# Patient Record
Sex: MALE | Race: BLACK OR AFRICAN AMERICAN | NOT HISPANIC OR LATINO | URBAN - METROPOLITAN AREA
[De-identification: names, ages, dates, MRNs, and addresses within clinical notes are randomized per-mention and may not be internally consistent; named-entity substitution may affect disease eponyms.]

---

## 2017-06-14 ENCOUNTER — EMERGENCY (EMERGENCY)
Facility: HOSPITAL | Age: 62
LOS: 1 days | Discharge: HOME | End: 2017-06-14
Attending: EMERGENCY MEDICINE | Admitting: EMERGENCY MEDICINE
Payer: COMMERCIAL

## 2017-06-14 VITALS
RESPIRATION RATE: 18 BRPM | HEART RATE: 59 BPM | TEMPERATURE: 98 F | WEIGHT: 217.38 LBS | SYSTOLIC BLOOD PRESSURE: 117 MMHG | OXYGEN SATURATION: 99 % | DIASTOLIC BLOOD PRESSURE: 79 MMHG

## 2017-06-14 DIAGNOSIS — M25.562 PAIN IN LEFT KNEE: ICD-10-CM

## 2017-06-14 PROCEDURE — 99284 EMERGENCY DEPT VISIT MOD MDM: CPT | Mod: 25

## 2017-06-14 RX ORDER — KETOROLAC TROMETHAMINE 30 MG/ML
30 SYRINGE (ML) INJECTION ONCE
Qty: 0 | Refills: 0 | Status: DISCONTINUED | OUTPATIENT
Start: 2017-06-14 | End: 2017-06-14

## 2017-06-14 RX ADMIN — Medication 30 MILLIGRAM(S): at 23:40

## 2017-06-14 NOTE — ED ADULT NURSE NOTE - OBJECTIVE STATEMENT
62 year old 62 year old male patient with c/o of L knee pain.  Hx of arthritis.  A+OX3, ambulatory w steady gait.

## 2017-06-14 NOTE — ED PROVIDER NOTE - OBJECTIVE STATEMENT
62M no PMH c/o L knee and leg pain. 62M no PMH c/o L knee and leg pain. pt states pain ongoing x1 week.  states 3 weeks ago had injections to both knees to increase lubrication of joint. pt states pain to L leg occurs when his knee is bent.  has appt with ortho 7/3 for f/u. no swelling. no redness, no fevers, no numbness/weakness. took motrin without relief.

## 2017-06-14 NOTE — ED PROVIDER NOTE - PROGRESS NOTE DETAILS
negative DVT, arthritic changes on xray. recommend ortho f/u  I have discussed the discharge plan with the patient. The patient agrees with the plan, as discussed.  The patient understands Emergency Department diagnosis is a preliminary diagnosis often based on limited information and that the patient must adhere to the follow-up plan as discussed.  The patient understands that if the symptoms worsen or if prescribed medications do not have the desired/planned effect that the patient may return to the Emergency Department at any time for further evaluation and treatment.

## 2017-06-14 NOTE — ED PROVIDER NOTE - MUSCULOSKELETAL MINIMAL EXAM
atraumatic/L knee - decreased flexion d/t pain, no erythema, no warmth, no effusion, neg med/lat laxity, neg ant/post drawer, no edema, no calf tenderness

## 2017-06-15 PROCEDURE — 93971 EXTREMITY STUDY: CPT

## 2017-06-15 PROCEDURE — 93971 EXTREMITY STUDY: CPT | Mod: 26,LT

## 2017-06-15 PROCEDURE — 99284 EMERGENCY DEPT VISIT MOD MDM: CPT | Mod: 25

## 2017-06-15 PROCEDURE — 96372 THER/PROPH/DIAG INJ SC/IM: CPT

## 2017-06-15 PROCEDURE — 73562 X-RAY EXAM OF KNEE 3: CPT

## 2017-06-15 PROCEDURE — 73562 X-RAY EXAM OF KNEE 3: CPT | Mod: 26,LT

## 2017-11-17 PROBLEM — Z00.00 ENCOUNTER FOR PREVENTIVE HEALTH EXAMINATION: Status: ACTIVE | Noted: 2017-11-17

## 2017-11-21 ENCOUNTER — APPOINTMENT (OUTPATIENT)
Dept: ORTHOPEDIC SURGERY | Facility: CLINIC | Age: 62
End: 2017-11-21
Payer: COMMERCIAL

## 2017-11-21 VITALS
BODY MASS INDEX: 29.4 KG/M2 | SYSTOLIC BLOOD PRESSURE: 131 MMHG | DIASTOLIC BLOOD PRESSURE: 88 MMHG | HEIGHT: 71 IN | WEIGHT: 210 LBS | HEART RATE: 61 BPM

## 2017-11-21 DIAGNOSIS — M25.561 PAIN IN RIGHT KNEE: ICD-10-CM

## 2017-11-21 DIAGNOSIS — Z87.39 PERSONAL HISTORY OF OTHER DISEASES OF THE MUSCULOSKELETAL SYSTEM AND CONNECTIVE TISSUE: ICD-10-CM

## 2017-11-21 DIAGNOSIS — Z78.9 OTHER SPECIFIED HEALTH STATUS: ICD-10-CM

## 2017-11-21 DIAGNOSIS — Z87.898 PERSONAL HISTORY OF OTHER SPECIFIED CONDITIONS: ICD-10-CM

## 2017-11-21 DIAGNOSIS — R53.83 OTHER FATIGUE: ICD-10-CM

## 2017-11-21 DIAGNOSIS — M25.562 PAIN IN RIGHT KNEE: ICD-10-CM

## 2017-11-21 PROCEDURE — 99204 OFFICE O/P NEW MOD 45 MIN: CPT

## 2017-11-21 PROCEDURE — 73564 X-RAY EXAM KNEE 4 OR MORE: CPT | Mod: 50

## 2017-11-22 PROBLEM — Z78.9 RARELY CONSUMES ALCOHOL: Status: ACTIVE | Noted: 2017-11-21

## 2017-11-22 PROBLEM — Z87.898 HISTORY OF WEIGHT GAIN: Status: RESOLVED | Noted: 2017-11-21 | Resolved: 2017-11-22

## 2017-11-22 PROBLEM — Z87.39 HISTORY OF RHEUMATOID ARTHRITIS: Status: RESOLVED | Noted: 2017-11-21 | Resolved: 2017-11-22

## 2017-11-22 PROBLEM — Z87.898 HISTORY OF URINARY FREQUENCY: Status: RESOLVED | Noted: 2017-11-21 | Resolved: 2017-11-22

## 2017-11-22 PROBLEM — M25.561 BILATERAL KNEE PAIN: Status: ACTIVE | Noted: 2017-11-21

## 2017-11-22 PROBLEM — Z78.9 EXERCISES OCCASIONALLY: Status: ACTIVE | Noted: 2017-11-21

## 2017-11-22 PROBLEM — Z87.39 HISTORY OF JOINT PAIN: Status: RESOLVED | Noted: 2017-11-21 | Resolved: 2017-11-22

## 2017-11-22 PROBLEM — R53.83 FEELING TIRED: Status: RESOLVED | Noted: 2017-11-21 | Resolved: 2017-11-22

## 2017-11-22 RX ORDER — NAPROXEN 500 MG/1
500 TABLET ORAL
Qty: 60 | Refills: 0 | Status: ACTIVE | COMMUNITY
Start: 2017-07-07

## 2017-11-22 RX ORDER — METAXALONE 800 MG/1
800 TABLET ORAL
Qty: 40 | Refills: 0 | Status: ACTIVE | COMMUNITY
Start: 2017-07-07

## 2017-11-22 RX ORDER — PREDNISONE 5 MG/1
5 TABLET ORAL
Qty: 60 | Refills: 0 | Status: ACTIVE | COMMUNITY
Start: 2017-09-12

## 2018-08-09 ENCOUNTER — APPOINTMENT (OUTPATIENT)
Dept: ORTHOPEDIC SURGERY | Facility: CLINIC | Age: 63
End: 2018-08-09
Payer: COMMERCIAL

## 2018-08-09 VITALS
SYSTOLIC BLOOD PRESSURE: 118 MMHG | BODY MASS INDEX: 29.68 KG/M2 | HEIGHT: 71 IN | DIASTOLIC BLOOD PRESSURE: 76 MMHG | WEIGHT: 212 LBS | HEART RATE: 67 BPM

## 2018-08-09 DIAGNOSIS — M17.0 BILATERAL PRIMARY OSTEOARTHRITIS OF KNEE: ICD-10-CM

## 2018-08-09 PROCEDURE — 99214 OFFICE O/P EST MOD 30 MIN: CPT

## 2018-08-09 PROCEDURE — 73562 X-RAY EXAM OF KNEE 3: CPT | Mod: RT

## 2018-08-28 ENCOUNTER — MESSAGE (OUTPATIENT)
Age: 63
End: 2018-08-28

## 2018-10-02 ENCOUNTER — MESSAGE (OUTPATIENT)
Age: 63
End: 2018-10-02

## 2018-11-26 ENCOUNTER — OUTPATIENT (OUTPATIENT)
Dept: OUTPATIENT SERVICES | Facility: HOSPITAL | Age: 63
LOS: 1 days | End: 2018-11-26
Payer: COMMERCIAL

## 2018-11-26 DIAGNOSIS — Z22.321 CARRIER OR SUSPECTED CARRIER OF METHICILLIN SUSCEPTIBLE STAPHYLOCOCCUS AUREUS: ICD-10-CM

## 2018-11-26 LAB
MRSA PCR RESULT.: NEGATIVE — SIGNIFICANT CHANGE UP
S AUREUS DNA NOSE QL NAA+PROBE: NEGATIVE — SIGNIFICANT CHANGE UP

## 2018-11-26 PROCEDURE — 87641 MR-STAPH DNA AMP PROBE: CPT

## 2018-12-18 ENCOUNTER — APPOINTMENT (OUTPATIENT)
Dept: ORTHOPEDIC SURGERY | Facility: HOSPITAL | Age: 63
End: 2018-12-18

## 2018-12-18 ENCOUNTER — INPATIENT (INPATIENT)
Facility: HOSPITAL | Age: 63
LOS: 3 days | Discharge: HOME HEALTH | DRG: 462 | End: 2018-12-22
Attending: ORTHOPAEDIC SURGERY | Admitting: ORTHOPAEDIC SURGERY
Payer: COMMERCIAL

## 2018-12-18 ENCOUNTER — RESULT REVIEW (OUTPATIENT)
Age: 63
End: 2018-12-18

## 2018-12-18 VITALS
TEMPERATURE: 98 F | OXYGEN SATURATION: 98 % | SYSTOLIC BLOOD PRESSURE: 111 MMHG | HEART RATE: 74 BPM | RESPIRATION RATE: 18 BRPM | HEIGHT: 71 IN | DIASTOLIC BLOOD PRESSURE: 71 MMHG | WEIGHT: 203.05 LBS

## 2018-12-18 DIAGNOSIS — J30.9 ALLERGIC RHINITIS, UNSPECIFIED: ICD-10-CM

## 2018-12-18 DIAGNOSIS — M17.0 BILATERAL PRIMARY OSTEOARTHRITIS OF KNEE: ICD-10-CM

## 2018-12-18 DIAGNOSIS — F41.1 GENERALIZED ANXIETY DISORDER: ICD-10-CM

## 2018-12-18 LAB
BASOPHILS NFR BLD AUTO: 0.1 % — SIGNIFICANT CHANGE UP (ref 0–2)
EOSINOPHIL NFR BLD AUTO: 3.5 % — SIGNIFICANT CHANGE UP (ref 0–6)
HCT VFR BLD CALC: 39.7 % — SIGNIFICANT CHANGE UP (ref 39–50)
HGB BLD-MCNC: 13.2 G/DL — SIGNIFICANT CHANGE UP (ref 13–17)
LYMPHOCYTES # BLD AUTO: 25.1 % — SIGNIFICANT CHANGE UP (ref 13–44)
MCHC RBC-ENTMCNC: 26.4 PG — LOW (ref 27–34)
MCHC RBC-ENTMCNC: 33.2 G/DL — SIGNIFICANT CHANGE UP (ref 32–36)
MCV RBC AUTO: 79.4 FL — LOW (ref 80–100)
MONOCYTES NFR BLD AUTO: 7.8 % — SIGNIFICANT CHANGE UP (ref 2–14)
NEUTROPHILS NFR BLD AUTO: 63.5 % — SIGNIFICANT CHANGE UP (ref 43–77)
PLATELET # BLD AUTO: 241 K/UL — SIGNIFICANT CHANGE UP (ref 150–400)
RBC # BLD: 5 M/UL — SIGNIFICANT CHANGE UP (ref 4.2–5.8)
RBC # FLD: 14.8 % — SIGNIFICANT CHANGE UP (ref 10.3–16.9)
WBC # BLD: 7.1 K/UL — SIGNIFICANT CHANGE UP (ref 3.8–10.5)
WBC # FLD AUTO: 7.1 K/UL — SIGNIFICANT CHANGE UP (ref 3.8–10.5)

## 2018-12-18 PROCEDURE — 73560 X-RAY EXAM OF KNEE 1 OR 2: CPT | Mod: 26,50

## 2018-12-18 PROCEDURE — 27447 TOTAL KNEE ARTHROPLASTY: CPT | Mod: RT

## 2018-12-18 RX ORDER — POLYETHYLENE GLYCOL 3350 17 G/17G
17 POWDER, FOR SOLUTION ORAL DAILY
Qty: 0 | Refills: 0 | Status: DISCONTINUED | OUTPATIENT
Start: 2018-12-19 | End: 2018-12-22

## 2018-12-18 RX ORDER — KETOROLAC TROMETHAMINE 30 MG/ML
15 SYRINGE (ML) INJECTION EVERY 6 HOURS
Qty: 0 | Refills: 0 | Status: DISCONTINUED | OUTPATIENT
Start: 2018-12-18 | End: 2018-12-19

## 2018-12-18 RX ORDER — OXYCODONE HYDROCHLORIDE 5 MG/1
20 TABLET ORAL ONCE
Qty: 0 | Refills: 0 | Status: DISCONTINUED | OUTPATIENT
Start: 2018-12-18 | End: 2018-12-18

## 2018-12-18 RX ORDER — SENNA PLUS 8.6 MG/1
2 TABLET ORAL AT BEDTIME
Qty: 0 | Refills: 0 | Status: DISCONTINUED | OUTPATIENT
Start: 2018-12-18 | End: 2018-12-22

## 2018-12-18 RX ORDER — MAGNESIUM HYDROXIDE 400 MG/1
30 TABLET, CHEWABLE ORAL DAILY
Qty: 0 | Refills: 0 | Status: DISCONTINUED | OUTPATIENT
Start: 2018-12-18 | End: 2018-12-22

## 2018-12-18 RX ORDER — CELECOXIB 200 MG/1
200 CAPSULE ORAL
Qty: 0 | Refills: 0 | Status: DISCONTINUED | OUTPATIENT
Start: 2018-12-19 | End: 2018-12-22

## 2018-12-18 RX ORDER — MORPHINE SULFATE 50 MG/1
4 CAPSULE, EXTENDED RELEASE ORAL EVERY 4 HOURS
Qty: 0 | Refills: 0 | Status: DISCONTINUED | OUTPATIENT
Start: 2018-12-18 | End: 2018-12-19

## 2018-12-18 RX ORDER — MORPHINE SULFATE 50 MG/1
4 CAPSULE, EXTENDED RELEASE ORAL
Qty: 0 | Refills: 0 | Status: DISCONTINUED | OUTPATIENT
Start: 2018-12-18 | End: 2018-12-19

## 2018-12-18 RX ORDER — ONDANSETRON 8 MG/1
4 TABLET, FILM COATED ORAL EVERY 6 HOURS
Qty: 0 | Refills: 0 | Status: DISCONTINUED | OUTPATIENT
Start: 2018-12-18 | End: 2018-12-22

## 2018-12-18 RX ORDER — DOCUSATE SODIUM 100 MG
100 CAPSULE ORAL THREE TIMES A DAY
Qty: 0 | Refills: 0 | Status: DISCONTINUED | OUTPATIENT
Start: 2018-12-18 | End: 2018-12-22

## 2018-12-18 RX ORDER — CEFAZOLIN SODIUM 1 G
2000 VIAL (EA) INJECTION EVERY 8 HOURS
Qty: 0 | Refills: 0 | Status: COMPLETED | OUTPATIENT
Start: 2018-12-18 | End: 2018-12-19

## 2018-12-18 RX ORDER — OXYCODONE HYDROCHLORIDE 5 MG/1
5 TABLET ORAL EVERY 4 HOURS
Qty: 0 | Refills: 0 | Status: DISCONTINUED | OUTPATIENT
Start: 2018-12-18 | End: 2018-12-22

## 2018-12-18 RX ORDER — SODIUM CHLORIDE 9 MG/ML
1000 INJECTION, SOLUTION INTRAVENOUS
Qty: 0 | Refills: 0 | Status: DISCONTINUED | OUTPATIENT
Start: 2018-12-18 | End: 2018-12-19

## 2018-12-18 RX ORDER — BUPIVACAINE 13.3 MG/ML
20 INJECTION, SUSPENSION, LIPOSOMAL INFILTRATION ONCE
Qty: 0 | Refills: 0 | Status: DISCONTINUED | OUTPATIENT
Start: 2018-12-18 | End: 2018-12-22

## 2018-12-18 RX ORDER — ASPIRIN/CALCIUM CARB/MAGNESIUM 324 MG
325 TABLET ORAL
Qty: 0 | Refills: 0 | Status: DISCONTINUED | OUTPATIENT
Start: 2018-12-18 | End: 2018-12-22

## 2018-12-18 RX ORDER — ACETAMINOPHEN 500 MG
650 TABLET ORAL EVERY 4 HOURS
Qty: 0 | Refills: 0 | Status: DISCONTINUED | OUTPATIENT
Start: 2018-12-18 | End: 2018-12-19

## 2018-12-18 RX ORDER — PANTOPRAZOLE SODIUM 20 MG/1
40 TABLET, DELAYED RELEASE ORAL
Qty: 0 | Refills: 0 | Status: DISCONTINUED | OUTPATIENT
Start: 2018-12-19 | End: 2018-12-22

## 2018-12-18 RX ORDER — OXYCODONE HYDROCHLORIDE 5 MG/1
10 TABLET ORAL EVERY 4 HOURS
Qty: 0 | Refills: 0 | Status: DISCONTINUED | OUTPATIENT
Start: 2018-12-18 | End: 2018-12-22

## 2018-12-18 RX ORDER — CELECOXIB 200 MG/1
400 CAPSULE ORAL ONCE
Qty: 0 | Refills: 0 | Status: COMPLETED | OUTPATIENT
Start: 2018-12-18 | End: 2018-12-18

## 2018-12-18 RX ADMIN — Medication 15 MILLIGRAM(S): at 21:42

## 2018-12-18 RX ADMIN — OXYCODONE HYDROCHLORIDE 20 MILLIGRAM(S): 5 TABLET ORAL at 12:35

## 2018-12-18 RX ADMIN — Medication 100 MILLIGRAM(S): at 21:43

## 2018-12-18 RX ADMIN — SENNA PLUS 2 TABLET(S): 8.6 TABLET ORAL at 22:52

## 2018-12-18 RX ADMIN — Medication 100 MILLIGRAM(S): at 22:53

## 2018-12-18 RX ADMIN — CELECOXIB 400 MILLIGRAM(S): 200 CAPSULE ORAL at 12:35

## 2018-12-18 RX ADMIN — Medication 15 MILLIGRAM(S): at 21:22

## 2018-12-18 RX ADMIN — OXYCODONE HYDROCHLORIDE 5 MILLIGRAM(S): 5 TABLET ORAL at 21:43

## 2018-12-18 RX ADMIN — OXYCODONE HYDROCHLORIDE 5 MILLIGRAM(S): 5 TABLET ORAL at 21:26

## 2018-12-18 NOTE — H&P ADULT - HISTORY OF PRESENT ILLNESS
62 yo male with chronic bilateral knee pain worsened over time without improvement. Denies numbness, tingling. Ambulates without assist. Presents today for elective B/l TKA

## 2018-12-18 NOTE — H&P ADULT - NSHPPHYSICALEXAM_GEN_ALL_CORE
bilateral LE: Decreased ROM secondary to pain, sensation intact, DP 2+, brisk cap refill, EHL/FHL/TA/GS 5/5  Rest of PE per MD clearance

## 2018-12-18 NOTE — BRIEF OPERATIVE NOTE - PROCEDURE
<<-----Click on this checkbox to enter Procedure Bilateral total knee replacements  12/18/2018    Active  [facility]

## 2018-12-18 NOTE — CONSULT NOTE ADULT - SUBJECTIVE AND OBJECTIVE BOX
HPI:  63 year old male with osteoarthritis bilateral knees with moderate knee pain, deformity, decreased ROM and unsteady gait.  X rays confirm diagnosis.  Symptoms worse with stairs and after prolonged immobility and increased over years.    PAST MEDICAL & SURGICAL HISTORY:  Denies DM HBP PUD ASTHMA  allergic rhinitis  No significant past surgical history      REVIEW OF SYSTEMS    General:  normal  Skin/Breast: normal  Ophthalmologic: negative  ENMT:	normal  Respiratory and Thorax: normal  Cardiovascular:	normal  Gastrointestinal:	normal  Genitourinary:	normal  Musculoskeletal:	 bilateral knee swelling   Neurological:	normal  Psychiatric:	anxiety  Hematology/Lymphatics:	 negative  Endocrine:	negative  Allergic/Immunologic:	negative      MEDICATIONS      Allergies    No Known Allergies    SOCIAL HISTORY: no cigs social alcohol    FAMILY HISTORY: non contributory    PHYSICAL EXAM:     Vital Signs Last 24 Hrs  T(C): --  T(F): --98.6  HR: --72  BP: --120/80  BP(mean): --  RR: --16  SpO2: --    Constitutional: WDWNM in NAD  Eyes: conj pink  ENMT: negative  Neck: supple  Breasts: not examined   Back: negative  Respiratory: clear to P&A  Cardiovascular: no MRGT or H  Gastrointestinal: normal bowel sounds  Genitourinary: neg  Rectal: not examined  Extremities: normal  Vascular: normal  Neurological: normal  Skin: negative  Lymph Nodes: negative  Musculoskeletal:   decreased ROM  bilateral knees  Psychiatric: anxiety

## 2018-12-18 NOTE — H&P ADULT - ASSESSMENT
62 yo male with chronic  bilateral knee pain failed conservative management will proceed with elective B/L TKA. Patient understands risks vs. benefits

## 2018-12-19 ENCOUNTER — TRANSCRIPTION ENCOUNTER (OUTPATIENT)
Age: 63
End: 2018-12-19

## 2018-12-19 DIAGNOSIS — M25.561 PAIN IN RIGHT KNEE: ICD-10-CM

## 2018-12-19 LAB
ANION GAP SERPL CALC-SCNC: 9 MMOL/L — SIGNIFICANT CHANGE UP (ref 5–17)
BUN SERPL-MCNC: 9 MG/DL — SIGNIFICANT CHANGE UP (ref 7–23)
CALCIUM SERPL-MCNC: 8.8 MG/DL — SIGNIFICANT CHANGE UP (ref 8.4–10.5)
CHLORIDE SERPL-SCNC: 98 MMOL/L — SIGNIFICANT CHANGE UP (ref 96–108)
CO2 SERPL-SCNC: 27 MMOL/L — SIGNIFICANT CHANGE UP (ref 22–31)
CREAT SERPL-MCNC: 0.84 MG/DL — SIGNIFICANT CHANGE UP (ref 0.5–1.3)
GLUCOSE SERPL-MCNC: 145 MG/DL — HIGH (ref 70–99)
HCT VFR BLD CALC: 35.9 % — LOW (ref 39–50)
HGB BLD-MCNC: 11.9 G/DL — LOW (ref 13–17)
MCHC RBC-ENTMCNC: 26.4 PG — LOW (ref 27–34)
MCHC RBC-ENTMCNC: 33.1 G/DL — SIGNIFICANT CHANGE UP (ref 32–36)
MCV RBC AUTO: 79.8 FL — LOW (ref 80–100)
PLATELET # BLD AUTO: 214 K/UL — SIGNIFICANT CHANGE UP (ref 150–400)
POTASSIUM SERPL-MCNC: 4 MMOL/L — SIGNIFICANT CHANGE UP (ref 3.5–5.3)
POTASSIUM SERPL-SCNC: 4 MMOL/L — SIGNIFICANT CHANGE UP (ref 3.5–5.3)
RBC # BLD: 4.5 M/UL — SIGNIFICANT CHANGE UP (ref 4.2–5.8)
RBC # FLD: 14.8 % — SIGNIFICANT CHANGE UP (ref 10.3–16.9)
SODIUM SERPL-SCNC: 134 MMOL/L — LOW (ref 135–145)
SURGICAL PATHOLOGY STUDY: SIGNIFICANT CHANGE UP
WBC # BLD: 11.2 K/UL — HIGH (ref 3.8–10.5)
WBC # FLD AUTO: 11.2 K/UL — HIGH (ref 3.8–10.5)

## 2018-12-19 RX ORDER — OXYCODONE HYDROCHLORIDE 5 MG/1
10 TABLET ORAL
Qty: 0 | Refills: 0 | Status: DISCONTINUED | OUTPATIENT
Start: 2018-12-19 | End: 2018-12-22

## 2018-12-19 RX ORDER — HYDROMORPHONE HYDROCHLORIDE 2 MG/ML
0.5 INJECTION INTRAMUSCULAR; INTRAVENOUS; SUBCUTANEOUS EVERY 4 HOURS
Qty: 0 | Refills: 0 | Status: DISCONTINUED | OUTPATIENT
Start: 2018-12-19 | End: 2018-12-22

## 2018-12-19 RX ORDER — ACETAMINOPHEN 500 MG
975 TABLET ORAL EVERY 8 HOURS
Qty: 0 | Refills: 0 | Status: DISCONTINUED | OUTPATIENT
Start: 2018-12-19 | End: 2018-12-22

## 2018-12-19 RX ADMIN — POLYETHYLENE GLYCOL 3350 17 GRAM(S): 17 POWDER, FOR SOLUTION ORAL at 13:03

## 2018-12-19 RX ADMIN — OXYCODONE HYDROCHLORIDE 10 MILLIGRAM(S): 5 TABLET ORAL at 21:45

## 2018-12-19 RX ADMIN — Medication 100 MILLIGRAM(S): at 13:03

## 2018-12-19 RX ADMIN — OXYCODONE HYDROCHLORIDE 10 MILLIGRAM(S): 5 TABLET ORAL at 16:05

## 2018-12-19 RX ADMIN — MORPHINE SULFATE 4 MILLIGRAM(S): 50 CAPSULE, EXTENDED RELEASE ORAL at 13:29

## 2018-12-19 RX ADMIN — OXYCODONE HYDROCHLORIDE 5 MILLIGRAM(S): 5 TABLET ORAL at 10:23

## 2018-12-19 RX ADMIN — OXYCODONE HYDROCHLORIDE 10 MILLIGRAM(S): 5 TABLET ORAL at 02:00

## 2018-12-19 RX ADMIN — Medication 975 MILLIGRAM(S): at 21:01

## 2018-12-19 RX ADMIN — MORPHINE SULFATE 4 MILLIGRAM(S): 50 CAPSULE, EXTENDED RELEASE ORAL at 13:03

## 2018-12-19 RX ADMIN — HYDROMORPHONE HYDROCHLORIDE 0.5 MILLIGRAM(S): 2 INJECTION INTRAMUSCULAR; INTRAVENOUS; SUBCUTANEOUS at 19:50

## 2018-12-19 RX ADMIN — OXYCODONE HYDROCHLORIDE 5 MILLIGRAM(S): 5 TABLET ORAL at 11:16

## 2018-12-19 RX ADMIN — OXYCODONE HYDROCHLORIDE 10 MILLIGRAM(S): 5 TABLET ORAL at 21:01

## 2018-12-19 RX ADMIN — HYDROMORPHONE HYDROCHLORIDE 0.5 MILLIGRAM(S): 2 INJECTION INTRAMUSCULAR; INTRAVENOUS; SUBCUTANEOUS at 19:26

## 2018-12-19 RX ADMIN — Medication 100 MILLIGRAM(S): at 05:43

## 2018-12-19 RX ADMIN — Medication 1 TABLET(S): at 13:03

## 2018-12-19 RX ADMIN — Medication 15 MILLIGRAM(S): at 09:45

## 2018-12-19 RX ADMIN — Medication 15 MILLIGRAM(S): at 03:30

## 2018-12-19 RX ADMIN — Medication 975 MILLIGRAM(S): at 21:45

## 2018-12-19 RX ADMIN — PANTOPRAZOLE SODIUM 40 MILLIGRAM(S): 20 TABLET, DELAYED RELEASE ORAL at 07:13

## 2018-12-19 RX ADMIN — Medication 15 MILLIGRAM(S): at 02:52

## 2018-12-19 RX ADMIN — OXYCODONE HYDROCHLORIDE 10 MILLIGRAM(S): 5 TABLET ORAL at 17:00

## 2018-12-19 RX ADMIN — Medication 325 MILLIGRAM(S): at 16:05

## 2018-12-19 RX ADMIN — Medication 325 MILLIGRAM(S): at 05:43

## 2018-12-19 RX ADMIN — Medication 100 MILLIGRAM(S): at 21:01

## 2018-12-19 RX ADMIN — SENNA PLUS 2 TABLET(S): 8.6 TABLET ORAL at 21:01

## 2018-12-19 RX ADMIN — Medication 15 MILLIGRAM(S): at 09:25

## 2018-12-19 RX ADMIN — OXYCODONE HYDROCHLORIDE 10 MILLIGRAM(S): 5 TABLET ORAL at 01:16

## 2018-12-19 NOTE — DISCHARGE NOTE ADULT - MEDICATION SUMMARY - MEDICATIONS TO TAKE
I will START or STAY ON the medications listed below when I get home from the hospital:    celecoxib 200 mg oral capsule  -- 1 cap(s) by mouth 2 times a day   -- Indication: For Acute pain of both knees    oxyCODONE 10 mg oral tablet  -- 1 tab(s) by mouth every 4 hours, As needed, Severe Pain (7 - 10) MDD:6  -- Indication: For Acute pain of both knees    aspirin 325 mg oral delayed release tablet  -- 1 tab(s) by mouth 2 times a day  -- Indication: For Acute pain of both knees    docusate sodium 100 mg oral capsule  -- 1 cap(s) by mouth 3 times a day  -- Indication: For Acute pain of both knees    senna oral tablet  -- 2 tab(s) by mouth once a day (at bedtime), As needed, Constipation-- 2nd Line  -- Indication: For Acute pain of both knees

## 2018-12-19 NOTE — PROGRESS NOTE ADULT - SUBJECTIVE AND OBJECTIVE BOX
HPI:  63 year old male with osteoarthritis bilateral knees with moderate knee pain, deformity, decreased ROM and unsteady gait.  X rays confirm diagnosis.  Symptoms worse with stairs and after prolonged immobility and increased over years.  Patient now day #1 post op bilateral TKR with moderate bilateral knee pain and malaise.    PAST MEDICAL & SURGICAL HISTORY:  Denies DM HBP PUD ASTHMA  allergic rhinitis  No significant past surgical history      MEDICATIONS  (STANDING):  aspirin enteric coated 325 milliGRAM(s) Oral two times a day  BUpivacaine liposome 1.3% Injectable (no eMAR) 20 milliLiter(s) Local Injection once  celecoxib 200 milliGRAM(s) Oral two times a day  docusate sodium 100 milliGRAM(s) Oral three times a day  ketorolac   Injectable 15 milliGRAM(s) IV Push every 6 hours  lactated ringers. 1000 milliLiter(s) (80 mL/Hr) IV Continuous <Continuous>  multivitamin 1 Tablet(s) Oral daily  pantoprazole    Tablet 40 milliGRAM(s) Oral before breakfast  polyethylene glycol 3350 17 Gram(s) Oral daily    MEDICATIONS  (PRN):  acetaminophen   Tablet .. 650 milliGRAM(s) Oral every 4 hours PRN Mild Pain (1 - 3)  aluminum hydroxide/magnesium hydroxide/simethicone Suspension 30 milliLiter(s) Oral four times a day PRN Indigestion  magnesium hydroxide Suspension 30 milliLiter(s) Oral daily PRN Constipation--1st Line  morphine  - Injectable 4 milliGRAM(s) IV Push every 4 hours PRN breakthrough pain  morphine  - Injectable 4 milliGRAM(s) IV Push every 15 minutes PRN Severe Pain (7 - 10)  ondansetron Injectable 4 milliGRAM(s) IV Push every 6 hours PRN Nausea and/or Vomiting  oxyCODONE    IR 5 milliGRAM(s) Oral every 4 hours PRN Moderate Pain (4 - 6)  oxyCODONE    IR 10 milliGRAM(s) Oral every 4 hours PRN Severe Pain (7 - 10)  senna 2 Tablet(s) Oral at bedtime PRN Constipation-- 2nd Line    Allergies    No Known Allergies    REVIEW OF SYSTEMS    General:  malaise	  Skin/Breast: normal  Ophthalmologic: negative  ENMT:	normal  Respiratory and Thorax: normal  Cardiovascular:	normal  Gastrointestinal:	normal  Genitourinary:	normal  Musculoskeletal:	 bilateral knee swelling   Neurological:	normal  Psychiatric:	normal  Hematology/Lymphatics:	 negative  Endocrine:	negative  Allergic/Immunologic:	negative      PHYSICAL EXAM:  Daily Height in cm: 180.34 (18 Dec 2018 11:44)       Vital Signs Last 24 Hrs  T(C): 36.9 (19 Dec 2018 05:20), Max: 36.9 (19 Dec 2018 05:20)  T(F): 98.4 (19 Dec 2018 05:20), Max: 98.4 (19 Dec 2018 05:20)  HR: 73 (19 Dec 2018 05:20) (48 - 74)  BP: 107/59 (19 Dec 2018 05:20) (107/59 - 139/69)  BP(mean): 99 (18 Dec 2018 21:17) (86 - 99)  RR: 16 (19 Dec 2018 05:20) (10 - 20)  SpO2: 98% (19 Dec 2018 05:20) (96% - 100%)    Constitutional: WDWNM  Eyes: conj pale  ENMT: negative  Neck: supple  Breasts: not examined   Back: negative  Respiratory: clear to P&A  Cardiovascular: no MRGT or H  Gastrointestinal: normal bowel sounds  Genitourinary: neg  Rectal: not examined  Extremities: normal  Vascular: normal  Neurological: normal  Skin: negative  Lymph Nodes: negative  Musculoskeletal:   decreased ROM  bilateral knees  Psychiatric: anxiety                        13.2   7.1   )-----------( 241      ( 18 Dec 2018 17:28 )             39.7

## 2018-12-19 NOTE — DISCHARGE NOTE ADULT - CARE PROVIDER_API CALL
Richi Guaman), Orthopaedic Surgery  [address] [place] [address]  [phone]  Fax: [phone]

## 2018-12-19 NOTE — PHYSICAL THERAPY INITIAL EVALUATION ADULT - PERTINENT HX OF CURRENT PROBLEM, REHAB EVAL
62 yo male with chronic bilateral knee pain worsened over time without improvement. Denies numbness, tingling. Ambulates without assist. Now s/p B/l TKA

## 2018-12-19 NOTE — PHYSICAL THERAPY INITIAL EVALUATION ADULT - GAIT DEVIATIONS NOTED, PT EVAL
decreased stride length/decreased swing-to-stance ratio/increased time in double stance/decreased step length/decreased velocity of limb motion

## 2018-12-19 NOTE — DISCHARGE NOTE ADULT - PATIENT PORTAL LINK FT
You can access the [facility], offered by [facility], by registering with the following website: [email or web address]

## 2018-12-19 NOTE — PROGRESS NOTE ADULT - SUBJECTIVE AND OBJECTIVE BOX
Pt seen and examined. Pt without complaints.    Focused exam:  b/l knees with dsgs intact with scant serosanguineous drainage  b/l le strength 5/5 GS, TA, FHL, EHL  b/l le SILT and WWP      A/P: 62yo male s/p b/l TKR    -DVT PPX: ASA 325mg bid  -WBS: WBAT  -Pain control: Pain regimen adjusted  -Disp: Pending

## 2018-12-19 NOTE — PROGRESS NOTE ADULT - SUBJECTIVE AND OBJECTIVE BOX
SUBJECTIVE: Patient seen and examined. No acute events overnight, pain well controlled, no fevers, chills, nausea, vomiting, chest pain, shortness of breath.    OBJECTIVE:     Vital Signs Last 24 Hrs  T(C): 36.9 (19 Dec 2018 05:20), Max: 36.9 (19 Dec 2018 05:20)  T(F): 98.4 (19 Dec 2018 05:20), Max: 98.4 (19 Dec 2018 05:20)  HR: 73 (19 Dec 2018 05:20) (48 - 74)  BP: 107/59 (19 Dec 2018 05:20) (107/59 - 139/69)  BP(mean): 99 (18 Dec 2018 21:17) (86 - 99)  RR: 16 (19 Dec 2018 05:20) (10 - 20)  SpO2: 98% (19 Dec 2018 05:20) (96% - 100%)    AxO x3, NAD  Unlabored respirations  BLE: dressing clean, dry, intact, intact DF/PF/EHL, SILT distally, foot warm, well perfused, calves soft, non-tender    LABS:                        11.9   11.2  )-----------( 214      ( 19 Dec 2018 06:05 )             35.9     12-19    134<L>  |  98  |  9   ----------------------------<  145<H>  4.0   |  27  |  0.84    Ca    8.8      19 Dec 2018 06:00            MEDICATIONS:acetaminophen   Tablet .. 650 milliGRAM(s) Oral every 4 hours PRN  aluminum hydroxide/magnesium hydroxide/simethicone Suspension 30 milliLiter(s) Oral four times a day PRN  aspirin enteric coated 325 milliGRAM(s) Oral two times a day  BUpivacaine liposome 1.3% Injectable (no eMAR) 20 milliLiter(s) Local Injection once  celecoxib 200 milliGRAM(s) Oral two times a day  docusate sodium 100 milliGRAM(s) Oral three times a day  ketorolac   Injectable 15 milliGRAM(s) IV Push every 6 hours  lactated ringers. 1000 milliLiter(s) IV Continuous <Continuous>  magnesium hydroxide Suspension 30 milliLiter(s) Oral daily PRN  morphine  - Injectable 4 milliGRAM(s) IV Push every 4 hours PRN  morphine  - Injectable 4 milliGRAM(s) IV Push every 15 minutes PRN  multivitamin 1 Tablet(s) Oral daily  ondansetron Injectable 4 milliGRAM(s) IV Push every 6 hours PRN  oxyCODONE    IR 5 milliGRAM(s) Oral every 4 hours PRN  oxyCODONE    IR 10 milliGRAM(s) Oral every 4 hours PRN  pantoprazole    Tablet 40 milliGRAM(s) Oral before breakfast  polyethylene glycol 3350 17 Gram(s) Oral daily  senna 2 Tablet(s) Oral at bedtime PRN    Anticoagulation:  aspirin enteric coated 325 milliGRAM(s) Oral two times a day      Antibiotics:       Pain medications:   acetaminophen   Tablet .. 650 milliGRAM(s) Oral every 4 hours PRN  celecoxib 200 milliGRAM(s) Oral two times a day  ketorolac   Injectable 15 milliGRAM(s) IV Push every 6 hours  morphine  - Injectable 4 milliGRAM(s) IV Push every 4 hours PRN  morphine  - Injectable 4 milliGRAM(s) IV Push every 15 minutes PRN  ondansetron Injectable 4 milliGRAM(s) IV Push every 6 hours PRN  oxyCODONE    IR 5 milliGRAM(s) Oral every 4 hours PRN  oxyCODONE    IR 10 milliGRAM(s) Oral every 4 hours PRN      RADIOLOGY & ADDITIONAL STUDIES:    A/P :     POD #1 s/p Prosper TKA    -recovering well  -pain control  -ophelia-op abx x24 hrs  -PT, WBAT  -DVT ppx:  BID, SCDs  -dispo: pending    Hal Tracy MD  Adult Reconstruction Fellow

## 2018-12-19 NOTE — PHYSICAL THERAPY INITIAL EVALUATION ADULT - ADDITIONAL COMMENTS
Patient lives with spouse in apartment building with elevator access (no steps). Does not use any DME at baseline. Spouse will be taking a month off to assist patient at home.

## 2018-12-19 NOTE — PHYSICAL THERAPY INITIAL EVALUATION ADULT - MODALITIES TREATMENT COMMENTS
b/l DP 2+. Supine, bilateral LEs: glute sets, quad sets, heel slides, ankle pumps (all through full range, 1 set, 10 reps). Patient tolerated therex well. Educated patient to perform therex regimen 3-5x/day, patient verbalized understanding; written handout provided.

## 2018-12-19 NOTE — DISCHARGE NOTE ADULT - ADDITIONAL INSTRUCTIONS
No strenuous activity, heavy lifting, driving or returning to work until cleared by MD.  You may shower - dressing is water-resistant, no soaking in bathtubs.  Remove dressing after post op day 5-7, then leave incision open to air. Keep incision clean and dry.  Try to have regular bowel movements, take stool softener or laxative if necessary.  May take Pepcid or Zantac for upset stomach.  May take Aleve or Naproxen instead of Meloxicam.  Swelling may travel all the way down leg to foot, this is normal and will subside in a few weeks.  Call to schedule an appointment with  _________ for follow up, if you have staples or sutures they will be removed in office.  Contact your doctor if you experience: fever greater than 101.5, chills, chest pain, difficulty breathing, redness or excessive drainage around the incision, other concerns.  Follow up with your primary care provider. No strenuous activity, heavy lifting, driving or returning to work until cleared by MD.  You may shower - dressing is water-resistant, no soaking in bathtubs.  Remove dressing after post op day 5-7, then leave incision open to air. Keep incision clean and dry.  Try to have regular bowel movements, take stool softener or laxative if necessary.  May take Pepcid or Zantac for upset stomach.  May take Aleve or Naproxen instead of Meloxicam.  Swelling may travel all the way down leg to foot, this is normal and will subside in a few weeks.  Call to schedule an appt with Dr. Guaman for follow up, if you have staples or sutures they will be removed in office.  Contact your doctor if you experience: fever greater than 101.5, chills, chest pain, difficulty breathing, redness or excessive drainage around the incision, other concerns.  See Dr. Guaman discharge instructions

## 2018-12-19 NOTE — DISCHARGE NOTE ADULT - CARE PLAN
Principal Discharge DX:	Primary osteoarthritis of both knees  Goal:	Improvement  Assessment and plan of treatment:	See below

## 2018-12-19 NOTE — PHYSICAL THERAPY INITIAL EVALUATION ADULT - GENERAL OBSERVATIONS, REHAB EVAL
Patient received semi-supine in no acute distress, c/o 2/10 pain (pre-medicated), +Telemetry, +SDCs, +Cryocuffs, +Heplock. Cleared by LEELA Cho.

## 2018-12-19 NOTE — PHYSICAL THERAPY INITIAL EVALUATION ADULT - CRITERIA FOR SKILLED THERAPEUTIC INTERVENTIONS
impairments found/predicted duration of therapy intervention/functional limitations in following categories/therapy frequency/anticipated equipment needs at discharge/anticipated discharge recommendation/risk reduction/prevention

## 2018-12-20 DIAGNOSIS — D62 ACUTE POSTHEMORRHAGIC ANEMIA: ICD-10-CM

## 2018-12-20 LAB
ANION GAP SERPL CALC-SCNC: 14 MMOL/L — SIGNIFICANT CHANGE UP (ref 5–17)
BUN SERPL-MCNC: 12 MG/DL — SIGNIFICANT CHANGE UP (ref 7–23)
CALCIUM SERPL-MCNC: 9.7 MG/DL — SIGNIFICANT CHANGE UP (ref 8.4–10.5)
CHLORIDE SERPL-SCNC: 96 MMOL/L — SIGNIFICANT CHANGE UP (ref 96–108)
CO2 SERPL-SCNC: 28 MMOL/L — SIGNIFICANT CHANGE UP (ref 22–31)
CREAT SERPL-MCNC: 1.01 MG/DL — SIGNIFICANT CHANGE UP (ref 0.5–1.3)
GLUCOSE SERPL-MCNC: 129 MG/DL — HIGH (ref 70–99)
HCT VFR BLD CALC: 36.1 % — LOW (ref 39–50)
HGB BLD-MCNC: 11.7 G/DL — LOW (ref 13–17)
MCHC RBC-ENTMCNC: 25.9 PG — LOW (ref 27–34)
MCHC RBC-ENTMCNC: 32.4 G/DL — SIGNIFICANT CHANGE UP (ref 32–36)
MCV RBC AUTO: 79.9 FL — LOW (ref 80–100)
PLATELET # BLD AUTO: 219 K/UL — SIGNIFICANT CHANGE UP (ref 150–400)
POTASSIUM SERPL-MCNC: 5 MMOL/L — SIGNIFICANT CHANGE UP (ref 3.5–5.3)
POTASSIUM SERPL-SCNC: 5 MMOL/L — SIGNIFICANT CHANGE UP (ref 3.5–5.3)
RBC # BLD: 4.52 M/UL — SIGNIFICANT CHANGE UP (ref 4.2–5.8)
RBC # FLD: 14.8 % — SIGNIFICANT CHANGE UP (ref 10.3–16.9)
SODIUM SERPL-SCNC: 138 MMOL/L — SIGNIFICANT CHANGE UP (ref 135–145)
WBC # BLD: 9.8 K/UL — SIGNIFICANT CHANGE UP (ref 3.8–10.5)
WBC # FLD AUTO: 9.8 K/UL — SIGNIFICANT CHANGE UP (ref 3.8–10.5)

## 2018-12-20 RX ORDER — LACTULOSE 10 G/15ML
20 SOLUTION ORAL ONCE
Qty: 0 | Refills: 0 | Status: COMPLETED | OUTPATIENT
Start: 2018-12-20 | End: 2018-12-20

## 2018-12-20 RX ADMIN — Medication 325 MILLIGRAM(S): at 17:49

## 2018-12-20 RX ADMIN — PANTOPRAZOLE SODIUM 40 MILLIGRAM(S): 20 TABLET, DELAYED RELEASE ORAL at 06:24

## 2018-12-20 RX ADMIN — OXYCODONE HYDROCHLORIDE 10 MILLIGRAM(S): 5 TABLET ORAL at 18:43

## 2018-12-20 RX ADMIN — OXYCODONE HYDROCHLORIDE 10 MILLIGRAM(S): 5 TABLET ORAL at 07:45

## 2018-12-20 RX ADMIN — Medication 975 MILLIGRAM(S): at 06:24

## 2018-12-20 RX ADMIN — POLYETHYLENE GLYCOL 3350 17 GRAM(S): 17 POWDER, FOR SOLUTION ORAL at 11:42

## 2018-12-20 RX ADMIN — CELECOXIB 200 MILLIGRAM(S): 200 CAPSULE ORAL at 06:26

## 2018-12-20 RX ADMIN — OXYCODONE HYDROCHLORIDE 10 MILLIGRAM(S): 5 TABLET ORAL at 08:16

## 2018-12-20 RX ADMIN — CELECOXIB 200 MILLIGRAM(S): 200 CAPSULE ORAL at 07:45

## 2018-12-20 RX ADMIN — OXYCODONE HYDROCHLORIDE 10 MILLIGRAM(S): 5 TABLET ORAL at 21:30

## 2018-12-20 RX ADMIN — Medication 975 MILLIGRAM(S): at 14:30

## 2018-12-20 RX ADMIN — CELECOXIB 200 MILLIGRAM(S): 200 CAPSULE ORAL at 18:49

## 2018-12-20 RX ADMIN — Medication 1 TABLET(S): at 11:42

## 2018-12-20 RX ADMIN — Medication 975 MILLIGRAM(S): at 07:45

## 2018-12-20 RX ADMIN — Medication 100 MILLIGRAM(S): at 21:30

## 2018-12-20 RX ADMIN — Medication 975 MILLIGRAM(S): at 13:30

## 2018-12-20 RX ADMIN — LACTULOSE 20 GRAM(S): 10 SOLUTION ORAL at 14:49

## 2018-12-20 RX ADMIN — Medication 975 MILLIGRAM(S): at 22:30

## 2018-12-20 RX ADMIN — Medication 100 MILLIGRAM(S): at 13:30

## 2018-12-20 RX ADMIN — OXYCODONE HYDROCHLORIDE 10 MILLIGRAM(S): 5 TABLET ORAL at 22:30

## 2018-12-20 RX ADMIN — Medication 100 MILLIGRAM(S): at 06:24

## 2018-12-20 RX ADMIN — Medication 975 MILLIGRAM(S): at 21:30

## 2018-12-20 RX ADMIN — CELECOXIB 200 MILLIGRAM(S): 200 CAPSULE ORAL at 17:49

## 2018-12-20 RX ADMIN — Medication 325 MILLIGRAM(S): at 06:24

## 2018-12-20 RX ADMIN — OXYCODONE HYDROCHLORIDE 10 MILLIGRAM(S): 5 TABLET ORAL at 01:19

## 2018-12-20 RX ADMIN — OXYCODONE HYDROCHLORIDE 10 MILLIGRAM(S): 5 TABLET ORAL at 12:41

## 2018-12-20 RX ADMIN — OXYCODONE HYDROCHLORIDE 10 MILLIGRAM(S): 5 TABLET ORAL at 11:41

## 2018-12-20 RX ADMIN — OXYCODONE HYDROCHLORIDE 10 MILLIGRAM(S): 5 TABLET ORAL at 00:30

## 2018-12-20 RX ADMIN — OXYCODONE HYDROCHLORIDE 10 MILLIGRAM(S): 5 TABLET ORAL at 17:49

## 2018-12-20 NOTE — PROGRESS NOTE ADULT - SUBJECTIVE AND OBJECTIVE BOX
HPI:  63 year old male with osteoarthritis bilateral knees with moderate knee pain, deformity, decreased ROM and unsteady gait.  X rays confirm diagnosis.  Symptoms worse with stairs and after prolonged immobility and increased over years.  Patient now day #2 post op bilateral TKR with moderate bilateral knee pain and malaise.    PAST MEDICAL & SURGICAL HISTORY:  Denies DM HBP PUD ASTHMA  allergic rhinitis  No significant past surgical history      MEDICATIONS  (STANDING):  acetaminophen   Tablet .. 975 milliGRAM(s) Oral every 8 hours  aspirin enteric coated 325 milliGRAM(s) Oral two times a day  BUpivacaine liposome 1.3% Injectable (no eMAR) 20 milliLiter(s) Local Injection once  celecoxib 200 milliGRAM(s) Oral two times a day  docusate sodium 100 milliGRAM(s) Oral three times a day  multivitamin 1 Tablet(s) Oral daily  oxyCODONE  ER Tablet 10 milliGRAM(s) Oral <User Schedule>  pantoprazole    Tablet 40 milliGRAM(s) Oral before breakfast  polyethylene glycol 3350 17 Gram(s) Oral daily    MEDICATIONS  (PRN):  aluminum hydroxide/magnesium hydroxide/simethicone Suspension 30 milliLiter(s) Oral four times a day PRN Indigestion  bisacodyl Suppository 10 milliGRAM(s) Rectal daily PRN If no bowel movement by postoperative day #2  HYDROmorphone  Injectable 0.5 milliGRAM(s) IV Push every 4 hours PRN break through pain  magnesium hydroxide Suspension 30 milliLiter(s) Oral daily PRN Constipation--1st Line  ondansetron Injectable 4 milliGRAM(s) IV Push every 6 hours PRN Nausea and/or Vomiting  oxyCODONE    IR 5 milliGRAM(s) Oral every 4 hours PRN Moderate Pain (4 - 6)  oxyCODONE    IR 10 milliGRAM(s) Oral every 4 hours PRN Severe Pain (7 - 10)  senna 2 Tablet(s) Oral at bedtime PRN Constipation-- 2nd Line    Allergies    No Known Allergies    REVIEW OF SYSTEMS    General:  malaise	  Skin/Breast: normal  Ophthalmologic: negative  ENMT:	normal  Respiratory and Thorax: normal  Cardiovascular:	normal  Gastrointestinal:	normal  Genitourinary:	normal  Musculoskeletal:	 bilateral knee swelling   Neurological:	normal  Psychiatric:	normal  Hematology/Lymphatics: negative  Endocrine:	negative  Allergic/Immunologic:	negative      PHYSICAL EXAM:      Vital Signs Last 24 Hrs  T(C): 36.9 (20 Dec 2018 04:41), Max: 38.1 (19 Dec 2018 20:23)  T(F): 98.4 (20 Dec 2018 04:41), Max: 100.5 (19 Dec 2018 20:23)  HR: 82 (20 Dec 2018 04:41) (72 - 96)  BP: 100/62 (20 Dec 2018 04:41) (99/69 - 140/66)  BP(mean): --  RR: 16 (20 Dec 2018 04:41) (14 - 17)  SpO2: 98% (20 Dec 2018 04:41) (94% - 100%)    Constitutional: WDWNF    Eyes: conj pale  ENMT: negative  Neck: supple  Breasts: not examined   Back: negative  Respiratory: clear to P&A  Cardiovascular: no MRGT or H  Gastrointestinal: normal bowel sounds  Genitourinary: neg  Rectal: not examined  Extremities: normal  Vascular: normal  Neurological: normal  Skin: negative  Lymph Nodes: negative  Musculoskeletal:   decreased ROM  bilateral knees  Psychiatric: anxiety                        11.9   11.2  )-----------( 214      ( 19 Dec 2018 06:05 )             35.9       12-19    134<L>  |  98  |  9   ----------------------------<  145<H>  4.0   |  27  |  0.84    Ca    8.8      19 Dec 2018 06:00

## 2018-12-20 NOTE — PROGRESS NOTE ADULT - SUBJECTIVE AND OBJECTIVE BOX
POST OPERATIVE DAY #: 2  STATUS POST: Bilateral TKA    SUBJECTIVE: Patient seen and examined. Pt. states he has been progressing well with physical therapy and anticipating going home soon. Denies any sob/cp/n/v.       OBJECTIVE:     Vital Signs Last 24 Hrs  T(C): 36.8 (20 Dec 2018 09:15), Max: 38.1 (19 Dec 2018 20:23)  T(F): 98.3 (20 Dec 2018 09:15), Max: 100.5 (19 Dec 2018 20:23)  HR: 94 (20 Dec 2018 09:15) (63 - 96)  BP: 96/74 (20 Dec 2018 09:15) (96/74 - 140/66)  BP(mean): --  RR: 17 (20 Dec 2018 09:15) (15 - 17)  SpO2: 95% (20 Dec 2018 09:15) (94% - 100%)    Affected extremity: b/l les         Dressing: clean/dry/intact          Sensation: intact to light touch to patient's baseline (mild numbness across toes but intact)         Motor exam: EHL/TA/GS 5/5   Pulses 2+             I&O's Detail    19 Dec 2018 07:01  -  20 Dec 2018 07:00  --------------------------------------------------------  IN:    lactated ringers.: 320 mL    Oral Fluid: 3330 mL  Total IN: 3650 mL    OUT:    Voided: 3025 mL  Total OUT: 3025 mL    Total NET: 625 mL      20 Dec 2018 07:01  -  20 Dec 2018 10:55  --------------------------------------------------------  IN:    Oral Fluid: 300 mL  Total IN: 300 mL    OUT:    Voided: 200 mL  Total OUT: 200 mL    Total NET: 100 mL          LABS:                        11.7   9.8   )-----------( 219      ( 20 Dec 2018 06:27 )             36.1     12-20    138  |  96  |  12  ----------------------------<  129<H>  5.0   |  28  |  1.01    Ca    9.7      20 Dec 2018 06:27      MEDICATIONS:    acetaminophen   Tablet .. 975 milliGRAM(s) Oral every 8 hours  celecoxib 200 milliGRAM(s) Oral two times a day  HYDROmorphone  Injectable 0.5 milliGRAM(s) IV Push every 4 hours PRN  ondansetron Injectable 4 milliGRAM(s) IV Push every 6 hours PRN  oxyCODONE    IR 5 milliGRAM(s) Oral every 4 hours PRN  oxyCODONE    IR 10 milliGRAM(s) Oral every 4 hours PRN  oxyCODONE  ER Tablet 10 milliGRAM(s) Oral <User Schedule>    aspirin enteric coated 325 milliGRAM(s) Oral two times a day        ASSESSMENT AND PLAN: 62yo Male s/p b/l tkr     1. Analgesic pain control  2. DVT prophylaxis: ASA     3. Weight Bearing Status:  Weight bearing as tolerated         4. Disposition: Home pending pt clearance POST OPERATIVE DAY #: 2  STATUS POST: Bilateral TKA    SUBJECTIVE: Patient seen and examined. Pt. states he has been progressing well with physical therapy and anticipating going home soon. Denies any sob/cp/n/v.       OBJECTIVE:     Vital Signs Last 24 Hrs  T(C): 36.8 (20 Dec 2018 09:15), Max: 38.1 (19 Dec 2018 20:23)  T(F): 98.3 (20 Dec 2018 09:15), Max: 100.5 (19 Dec 2018 20:23)  HR: 94 (20 Dec 2018 09:15) (63 - 96)  BP: 96/74 (20 Dec 2018 09:15) (96/74 - 140/66)  BP(mean): --  RR: 17 (20 Dec 2018 09:15) (15 - 17)  SpO2: 95% (20 Dec 2018 09:15) (94% - 100%)    Affected extremity: b/l les         Dressing: clean/dry/intact          Sensation: intact to light touch to patient's baseline (mild numbness across toes but intact)         Motor exam: EHL/TA/GS 5/5   Pulses 2+             I&O's Detail    19 Dec 2018 07:01  -  20 Dec 2018 07:00  --------------------------------------------------------  IN:    lactated ringers.: 320 mL    Oral Fluid: 3330 mL  Total IN: 3650 mL    OUT:    Voided: 3025 mL  Total OUT: 3025 mL    Total NET: 625 mL      20 Dec 2018 07:01  -  20 Dec 2018 10:55  --------------------------------------------------------  IN:    Oral Fluid: 300 mL  Total IN: 300 mL    OUT:    Voided: 200 mL  Total OUT: 200 mL    Total NET: 100 mL          LABS:                        11.7   9.8   )-----------( 219      ( 20 Dec 2018 06:27 )             36.1     12-20    138  |  96  |  12  ----------------------------<  129<H>  5.0   |  28  |  1.01    Ca    9.7      20 Dec 2018 06:27      MEDICATIONS:    acetaminophen   Tablet .. 975 milliGRAM(s) Oral every 8 hours  celecoxib 200 milliGRAM(s) Oral two times a day  HYDROmorphone  Injectable 0.5 milliGRAM(s) IV Push every 4 hours PRN  ondansetron Injectable 4 milliGRAM(s) IV Push every 6 hours PRN  oxyCODONE    IR 5 milliGRAM(s) Oral every 4 hours PRN  oxyCODONE    IR 10 milliGRAM(s) Oral every 4 hours PRN  oxyCODONE  ER Tablet 10 milliGRAM(s) Oral <User Schedule>    aspirin enteric coated 325 milliGRAM(s) Oral two times a day        ASSESSMENT AND PLAN: 64yo Male s/p b/l tkr     1. Analgesic pain control  2. DVT prophylaxis: ASA     3. Weight Bearing Status:  Weight bearing as tolerated         4. Disposition: Home pending pt clearance   5. Constipation- Pt. refusing suppository would rather take PO, ordered lactulose

## 2018-12-20 NOTE — PROGRESS NOTE ADULT - ASSESSMENT
A/P: 63y Male s/p bilateral TKA, POD 2  - Cont PT: WBAT/FROM without restrictions  - OOB with assistance, advance to ambulation as tolerated with assistive device  - Pain control  - DVT ppx  - Dispo: home pending PT clearance

## 2018-12-20 NOTE — PROGRESS NOTE ADULT - SUBJECTIVE AND OBJECTIVE BOX
ORTHO PROGRESS NOTE  YVES SAMSON  63y Male  MRN-[id number]  POD 2 s/p bilateral TKA    Patient examined at bedside. No acute events last 24hr. Pain well controlled. Progressing with PT. No complaints.    Vital Signs Last 24 Hrs  T(C): 36.9 (20 Dec 2018 04:41), Max: 38.1 (19 Dec 2018 20:23)  T(F): 98.4 (20 Dec 2018 04:41), Max: 100.5 (19 Dec 2018 20:23)  HR: 82 (20 Dec 2018 04:41) (72 - 96)  BP: 100/62 (20 Dec 2018 04:41) (99/69 - 140/66)  BP(mean): --  RR: 16 (20 Dec 2018 04:41) (14 - 17)  SpO2: 98% (20 Dec 2018 04:41) (94% - 100%)    AAOx3  NAD, pleasant, cooperative  Bilateral knees:  - Dressings changed to Aquacels; incisions clean/dry, Prineo dressings in good condition  - Cryocuffs in place  - No erythema/fluctuance  - Calf soft/nontender  - TA/GSC/EHL/FHL 5/5  - NVI                          11.9   11.2  )-----------( 214      ( 19 Dec 2018 06:05 )             35.9

## 2018-12-21 LAB
ANION GAP SERPL CALC-SCNC: 10 MMOL/L — SIGNIFICANT CHANGE UP (ref 5–17)
BUN SERPL-MCNC: 14 MG/DL — SIGNIFICANT CHANGE UP (ref 7–23)
CALCIUM SERPL-MCNC: 9.4 MG/DL — SIGNIFICANT CHANGE UP (ref 8.4–10.5)
CHLORIDE SERPL-SCNC: 97 MMOL/L — SIGNIFICANT CHANGE UP (ref 96–108)
CO2 SERPL-SCNC: 30 MMOL/L — SIGNIFICANT CHANGE UP (ref 22–31)
CREAT SERPL-MCNC: 1.01 MG/DL — SIGNIFICANT CHANGE UP (ref 0.5–1.3)
GLUCOSE SERPL-MCNC: 103 MG/DL — HIGH (ref 70–99)
HCT VFR BLD CALC: 31.1 % — LOW (ref 39–50)
HGB BLD-MCNC: 10.1 G/DL — LOW (ref 13–17)
MCHC RBC-ENTMCNC: 26.2 PG — LOW (ref 27–34)
MCHC RBC-ENTMCNC: 32.5 G/DL — SIGNIFICANT CHANGE UP (ref 32–36)
MCV RBC AUTO: 80.6 FL — SIGNIFICANT CHANGE UP (ref 80–100)
PLATELET # BLD AUTO: 204 K/UL — SIGNIFICANT CHANGE UP (ref 150–400)
POTASSIUM SERPL-MCNC: 4.1 MMOL/L — SIGNIFICANT CHANGE UP (ref 3.5–5.3)
POTASSIUM SERPL-SCNC: 4.1 MMOL/L — SIGNIFICANT CHANGE UP (ref 3.5–5.3)
RBC # BLD: 3.86 M/UL — LOW (ref 4.2–5.8)
RBC # FLD: 14.5 % — SIGNIFICANT CHANGE UP (ref 10.3–16.9)
SODIUM SERPL-SCNC: 137 MMOL/L — SIGNIFICANT CHANGE UP (ref 135–145)
WBC # BLD: 9.9 K/UL — SIGNIFICANT CHANGE UP (ref 3.8–10.5)
WBC # FLD AUTO: 9.9 K/UL — SIGNIFICANT CHANGE UP (ref 3.8–10.5)

## 2018-12-21 RX ORDER — SENNA PLUS 8.6 MG/1
2 TABLET ORAL
Qty: 0 | Refills: 0 | COMMUNITY
Start: 2018-12-21

## 2018-12-21 RX ORDER — CELECOXIB 200 MG/1
1 CAPSULE ORAL
Qty: 0 | Refills: 0 | COMMUNITY
Start: 2018-12-21

## 2018-12-21 RX ORDER — ASPIRIN/CALCIUM CARB/MAGNESIUM 324 MG
1 TABLET ORAL
Qty: 0 | Refills: 0 | COMMUNITY
Start: 2018-12-21

## 2018-12-21 RX ORDER — OXYCODONE HYDROCHLORIDE 5 MG/1
1 TABLET ORAL
Qty: 0 | Refills: 0 | COMMUNITY
Start: 2018-12-21

## 2018-12-21 RX ORDER — DOCUSATE SODIUM 100 MG
1 CAPSULE ORAL
Qty: 0 | Refills: 0 | COMMUNITY
Start: 2018-12-21

## 2018-12-21 RX ADMIN — OXYCODONE HYDROCHLORIDE 10 MILLIGRAM(S): 5 TABLET ORAL at 19:45

## 2018-12-21 RX ADMIN — OXYCODONE HYDROCHLORIDE 10 MILLIGRAM(S): 5 TABLET ORAL at 18:58

## 2018-12-21 RX ADMIN — OXYCODONE HYDROCHLORIDE 10 MILLIGRAM(S): 5 TABLET ORAL at 03:07

## 2018-12-21 RX ADMIN — OXYCODONE HYDROCHLORIDE 10 MILLIGRAM(S): 5 TABLET ORAL at 23:09

## 2018-12-21 RX ADMIN — CELECOXIB 200 MILLIGRAM(S): 200 CAPSULE ORAL at 18:04

## 2018-12-21 RX ADMIN — Medication 975 MILLIGRAM(S): at 14:46

## 2018-12-21 RX ADMIN — OXYCODONE HYDROCHLORIDE 10 MILLIGRAM(S): 5 TABLET ORAL at 13:00

## 2018-12-21 RX ADMIN — OXYCODONE HYDROCHLORIDE 10 MILLIGRAM(S): 5 TABLET ORAL at 06:15

## 2018-12-21 RX ADMIN — Medication 975 MILLIGRAM(S): at 05:30

## 2018-12-21 RX ADMIN — Medication 975 MILLIGRAM(S): at 06:30

## 2018-12-21 RX ADMIN — Medication 100 MILLIGRAM(S): at 05:30

## 2018-12-21 RX ADMIN — Medication 975 MILLIGRAM(S): at 15:40

## 2018-12-21 RX ADMIN — OXYCODONE HYDROCHLORIDE 10 MILLIGRAM(S): 5 TABLET ORAL at 02:07

## 2018-12-21 RX ADMIN — CELECOXIB 200 MILLIGRAM(S): 200 CAPSULE ORAL at 06:30

## 2018-12-21 RX ADMIN — CELECOXIB 200 MILLIGRAM(S): 200 CAPSULE ORAL at 19:00

## 2018-12-21 RX ADMIN — Medication 975 MILLIGRAM(S): at 23:09

## 2018-12-21 RX ADMIN — CELECOXIB 200 MILLIGRAM(S): 200 CAPSULE ORAL at 05:30

## 2018-12-21 RX ADMIN — OXYCODONE HYDROCHLORIDE 10 MILLIGRAM(S): 5 TABLET ORAL at 06:30

## 2018-12-21 RX ADMIN — Medication 975 MILLIGRAM(S): at 22:09

## 2018-12-21 RX ADMIN — OXYCODONE HYDROCHLORIDE 10 MILLIGRAM(S): 5 TABLET ORAL at 12:03

## 2018-12-21 RX ADMIN — POLYETHYLENE GLYCOL 3350 17 GRAM(S): 17 POWDER, FOR SOLUTION ORAL at 12:07

## 2018-12-21 RX ADMIN — Medication 100 MILLIGRAM(S): at 22:09

## 2018-12-21 RX ADMIN — PANTOPRAZOLE SODIUM 40 MILLIGRAM(S): 20 TABLET, DELAYED RELEASE ORAL at 07:27

## 2018-12-21 RX ADMIN — Medication 325 MILLIGRAM(S): at 18:04

## 2018-12-21 RX ADMIN — MAGNESIUM HYDROXIDE 30 MILLILITER(S): 400 TABLET, CHEWABLE ORAL at 06:14

## 2018-12-21 RX ADMIN — Medication 325 MILLIGRAM(S): at 05:31

## 2018-12-21 RX ADMIN — OXYCODONE HYDROCHLORIDE 10 MILLIGRAM(S): 5 TABLET ORAL at 22:09

## 2018-12-21 RX ADMIN — Medication 100 MILLIGRAM(S): at 14:46

## 2018-12-21 RX ADMIN — Medication 1 TABLET(S): at 12:07

## 2018-12-21 NOTE — PROGRESS NOTE ADULT - SUBJECTIVE AND OBJECTIVE BOX
HPI:  63 year old male with osteoarthritis bilateral knees with moderate knee pain, deformity, decreased ROM and unsteady gait.  X rays confirm diagnosis.  Symptoms worse with stairs and after prolonged immobility and increased over years.  Patient now day #3 post op bilateral TKR with moderate bilateral knee pain and malaise.    PAST MEDICAL & SURGICAL HISTORY:  Denies DM HBP PUD ASTHMA  allergic rhinitis  No significant past surgical history      MEDICATIONS  (STANDING):  acetaminophen   Tablet .. 975 milliGRAM(s) Oral every 8 hours  aspirin enteric coated 325 milliGRAM(s) Oral two times a day  BUpivacaine liposome 1.3% Injectable (no eMAR) 20 milliLiter(s) Local Injection once  celecoxib 200 milliGRAM(s) Oral two times a day  docusate sodium 100 milliGRAM(s) Oral three times a day  multivitamin 1 Tablet(s) Oral daily  oxyCODONE  ER Tablet 10 milliGRAM(s) Oral <User Schedule>  pantoprazole    Tablet 40 milliGRAM(s) Oral before breakfast  polyethylene glycol 3350 17 Gram(s) Oral daily    MEDICATIONS  (PRN):  aluminum hydroxide/magnesium hydroxide/simethicone Suspension 30 milliLiter(s) Oral four times a day PRN Indigestion  bisacodyl Suppository 10 milliGRAM(s) Rectal daily PRN If no bowel movement by postoperative day #2  HYDROmorphone  Injectable 0.5 milliGRAM(s) IV Push every 4 hours PRN break through pain  magnesium hydroxide Suspension 30 milliLiter(s) Oral daily PRN Constipation--1st Line  ondansetron Injectable 4 milliGRAM(s) IV Push every 6 hours PRN Nausea and/or Vomiting  oxyCODONE    IR 5 milliGRAM(s) Oral every 4 hours PRN Moderate Pain (4 - 6)  oxyCODONE    IR 10 milliGRAM(s) Oral every 4 hours PRN Severe Pain (7 - 10)  senna 2 Tablet(s) Oral at bedtime PRN Constipation-- 2nd Line    Allergies    No Known Allergies    REVIEW OF SYSTEMS    General:  malaise	  Skin/Breast: normal  Ophthalmologic: negative  ENMT:	normal  Respiratory and Thorax: normal  Cardiovascular:	normal  Gastrointestinal:	normal  Genitourinary:	normal  Musculoskeletal:	 bilateral knee swelling   Neurological:	normal  Psychiatric:	normal  Hematology/Lymphatics:	 negative  Endocrine:	negative  Allergic/Immunologic:	negative      PHYSICAL EXAM:     Vital Signs Last 24 Hrs  T(C): 36.9 (21 Dec 2018 05:28), Max: 36.9 (21 Dec 2018 05:28)  T(F): 98.4 (21 Dec 2018 05:28), Max: 98.4 (21 Dec 2018 05:28)  HR: 78 (21 Dec 2018 05:28) (63 - 98)  BP: 124/75 (21 Dec 2018 05:28) (96/74 - 125/68)  BP(mean): --  RR: 16 (21 Dec 2018 05:28) (15 - 17)  SpO2: 100% (21 Dec 2018 05:28) (95% - 100%)    Constitutional: WDWNM  Eyes: conj pale  ENMT: negative  Neck: supple  Breasts: not examined   Back: negative  Respiratory: clear to P&A  Cardiovascular: no MRGT or H  Gastrointestinal: normal bowel sounds  Genitourinary: neg  Rectal: not examined  Extremities: normal  Vascular: normal  Neurological: normal  Skin: negative  Lymph Nodes: negative  Musculoskeletal:   decreased ROM  bilateral knees   Psychiatric: anxiety                             11.7   9.8   )-----------( 219      ( 20 Dec 2018 06:27 )             36.1       12-20    138  |  96  |  12  ----------------------------<  129<H>  5.0   |  28  |  1.01    Ca    9.7      20 Dec 2018 06:27

## 2018-12-21 NOTE — PROGRESS NOTE ADULT - PROBLEM SELECTOR PLAN 2
Preop testing results reviewed. No medical contraindications for the proposed surgery.
support
Discussed with house staff and nursing staff.  Pain management, PT, incentive spirometer, DVT prophylaxis, repeat labs and discharge planning.

## 2018-12-21 NOTE — PROGRESS NOTE ADULT - SUBJECTIVE AND OBJECTIVE BOX
SUBJECTIVE: Patient seen and examined. No acute events overnight, pain well controlled, no fevers, chills, nausea, vomiting, chest pain, shortness of breath.    OBJECTIVE:     Vital Signs Last 24 Hrs  T(C): 36.9 (21 Dec 2018 05:28), Max: 36.9 (21 Dec 2018 05:28)  T(F): 98.4 (21 Dec 2018 05:28), Max: 98.4 (21 Dec 2018 05:28)  HR: 78 (21 Dec 2018 05:28) (63 - 98)  BP: 124/75 (21 Dec 2018 05:28) (96/74 - 125/68)  BP(mean): --  RR: 16 (21 Dec 2018 05:28) (15 - 17)  SpO2: 100% (21 Dec 2018 05:28) (95% - 100%)    AxO x3, NAD  Unlabored respirations  Prosper LE: dressing clean, dry, intact, intact DF/PF/EHL, SILT distally, foot warm, well perfused, calves soft, non-tender    LABS:                        10.1   9.9   )-----------( 204      ( 21 Dec 2018 05:56 )             31.1     12-21    137  |  97  |  14  ----------------------------<  103<H>  4.1   |  30  |  1.01    Ca    9.4      21 Dec 2018 05:57            MEDICATIONS:acetaminophen   Tablet .. 975 milliGRAM(s) Oral every 8 hours  aluminum hydroxide/magnesium hydroxide/simethicone Suspension 30 milliLiter(s) Oral four times a day PRN  aspirin enteric coated 325 milliGRAM(s) Oral two times a day  bisacodyl Suppository 10 milliGRAM(s) Rectal daily PRN  BUpivacaine liposome 1.3% Injectable (no eMAR) 20 milliLiter(s) Local Injection once  celecoxib 200 milliGRAM(s) Oral two times a day  docusate sodium 100 milliGRAM(s) Oral three times a day  HYDROmorphone  Injectable 0.5 milliGRAM(s) IV Push every 4 hours PRN  magnesium hydroxide Suspension 30 milliLiter(s) Oral daily PRN  multivitamin 1 Tablet(s) Oral daily  ondansetron Injectable 4 milliGRAM(s) IV Push every 6 hours PRN  oxyCODONE    IR 5 milliGRAM(s) Oral every 4 hours PRN  oxyCODONE    IR 10 milliGRAM(s) Oral every 4 hours PRN  oxyCODONE  ER Tablet 10 milliGRAM(s) Oral <User Schedule>  pantoprazole    Tablet 40 milliGRAM(s) Oral before breakfast  polyethylene glycol 3350 17 Gram(s) Oral daily  senna 2 Tablet(s) Oral at bedtime PRN    Anticoagulation:  aspirin enteric coated 325 milliGRAM(s) Oral two times a day      Antibiotics:       Pain medications:   acetaminophen   Tablet .. 975 milliGRAM(s) Oral every 8 hours  celecoxib 200 milliGRAM(s) Oral two times a day  HYDROmorphone  Injectable 0.5 milliGRAM(s) IV Push every 4 hours PRN  ondansetron Injectable 4 milliGRAM(s) IV Push every 6 hours PRN  oxyCODONE    IR 5 milliGRAM(s) Oral every 4 hours PRN  oxyCODONE    IR 10 milliGRAM(s) Oral every 4 hours PRN  oxyCODONE  ER Tablet 10 milliGRAM(s) Oral <User Schedule>      RADIOLOGY & ADDITIONAL STUDIES:    A/P :     POD #3 s/p Prosper TKA    -recovering well  -pain control  -ophelia-op Abx completed  -PT, WBAT  -DVT ppx:  BID, SCDs  -dispo: home once PT clear    Hal Tracy MD  Adult Reconstruction Fellow

## 2018-12-21 NOTE — OCCUPATIONAL THERAPY INITIAL EVALUATION ADULT - GENERAL OBSERVATIONS, REHAB EVAL
Patient cleared for OT evaluation by LEELA Clarke. Patient received semi-miller, NAD, +b cryocuffs, +b sequential compression devices, +IV heplock.

## 2018-12-21 NOTE — PROGRESS NOTE ADULT - SUBJECTIVE AND OBJECTIVE BOX
Ortho Note    Post-operative day #3 s/p b/l total knee replacement     Subjective:     Patient seen and examined. Patient comfortable without complaints, pain controlled. Patient states he has not had a bowel movement since 12/18/21 but states he is passing gas and denies abdominal pain/cramping. Patient states he does not want a suppository. Denies chest pain, SOB, N/V, numbness/tingling.    Objective:    Vital Signs Last 24 Hrs  T(C): 36.3 (12-21-18 @ 08:35), Max: 36.3 (12-21-18 @ 08:35)  T(F): 97.3 (12-21-18 @ 08:35), Max: 97.3 (12-21-18 @ 08:35)  HR: 76 (12-21-18 @ 08:35) (76 - 76)  BP: 110/63 (12-21-18 @ 08:35) (110/63 - 110/63)  BP(mean): --  RR: 17 (12-21-18 @ 08:35) (17 - 17)  SpO2: 97% (12-21-18 @ 08:35) (97% - 97%)  AVSS    PE:  General: Patient alert and oriented, NAD  Dressing: C/D/I bilateral knees with cryocuffs in place  Pulses: DP palpable bilateral lower extremities  Sensation: intact and equal to bilateral lower extremities   Motor: EHL/FHL/TA/GS 5/5 bilateral lower extremities                           10.1   9.9   )-----------( 204      ( 21 Dec 2018 05:56 )             31.1   21 Dec 2018 05:57    137    |  97     |  14     ----------------------------<  103    4.1     |  30     |  1.01           A/P: 63yMale POD#3 s/p bilateral total knee replacement   1. Pain control as needed  2. DVT prophylaxis:  BID  3. PT, WBS:  WBAT  4. Dispo: home pending PT clearance     Ortho Pager [phone] Ortho Note    Post-operative day #3 s/p b/l total knee replacement     Subjective:     Patient seen and examined. Patient comfortable without complaints, pain controlled. Patient states he has not had a bowel movement since 12/18/21 but states he is passing gas and denies abdominal pain/cramping. Patient states he does not want a suppository. Denies chest pain, SOB, N/V, numbness/tingling.    Objective:    Vital Signs Last 24 Hrs  T(C): 36.3 (12-21-18 @ 08:35), Max: 36.3 (12-21-18 @ 08:35)  T(F): 97.3 (12-21-18 @ 08:35), Max: 97.3 (12-21-18 @ 08:35)  HR: 76 (12-21-18 @ 08:35) (76 - 76)  BP: 110/63 (12-21-18 @ 08:35) (110/63 - 110/63)  BP(mean): --  RR: 17 (12-21-18 @ 08:35) (17 - 17)  SpO2: 97% (12-21-18 @ 08:35) (97% - 97%)  AVSS    PE:  General: Patient alert and oriented, NAD  Dressing: C/D/I bilateral knees with cryocuffs in place  Pulses: DP palpable bilateral lower extremities  Sensation: intact and equal to bilateral lower extremities   Motor: EHL/FHL/TA/GS 5/5 bilateral lower extremities   Abd: soft, non-tender, non-distended                          10.1   9.9   )-----------( 204      ( 21 Dec 2018 05:56 )             31.1   21 Dec 2018 05:57    137    |  97     |  14     ----------------------------<  103    4.1     |  30     |  1.01           A/P: 63yMale POD#3 s/p bilateral total knee replacement   1. Pain control as needed  2. DVT prophylaxis:  BID  3. PT, WBS:  WBAT  4. Dispo: home pending PT clearance     Ortho Pager [phone]

## 2018-12-21 NOTE — OCCUPATIONAL THERAPY INITIAL EVALUATION ADULT - PERTINENT HX OF CURRENT PROBLEM, REHAB EVAL
62 yo male with chronic bilateral knee pain worsened over time without improvement. Denies numbness, tingling. Ambulates without assist. Now s/p B/l TKA 12/18/18.

## 2018-12-21 NOTE — PROGRESS NOTE ADULT - PROBLEM SELECTOR PLAN 1
Preop testing results reviewed. No medical contraindications for the proposed surgery.
Discussed with house staff and nursing staff.  Pain management, PT, incentive spirometer, DVT prophylaxis, repeat labs and discharge planning.
Discussed with house staff and nursing staff.  Pain management, PT, incentive spirometer, DVT prophylaxis, repeat labs and discharge planning.

## 2018-12-21 NOTE — PROGRESS NOTE ADULT - PROBLEM SELECTOR PLAN 4
symptomatic rx
symptomatic rx
Discussed with house staff and nursing staff.  Pain management, PT, incentive spirometer, DVT prophylaxis, repeat labs and discharge planning.

## 2018-12-21 NOTE — PROGRESS NOTE ADULT - NSHPATTENDINGPLANDISCUSS_GEN_ALL_CORE
nursing staff house staff and Dr. Guaman

## 2018-12-22 VITALS
TEMPERATURE: 97 F | DIASTOLIC BLOOD PRESSURE: 71 MMHG | OXYGEN SATURATION: 100 % | RESPIRATION RATE: 16 BRPM | HEART RATE: 82 BPM | SYSTOLIC BLOOD PRESSURE: 108 MMHG

## 2018-12-22 LAB
ANION GAP SERPL CALC-SCNC: 10 MMOL/L — SIGNIFICANT CHANGE UP (ref 5–17)
BUN SERPL-MCNC: 18 MG/DL — SIGNIFICANT CHANGE UP (ref 7–23)
CALCIUM SERPL-MCNC: 9.1 MG/DL — SIGNIFICANT CHANGE UP (ref 8.4–10.5)
CHLORIDE SERPL-SCNC: 95 MMOL/L — LOW (ref 96–108)
CO2 SERPL-SCNC: 31 MMOL/L — SIGNIFICANT CHANGE UP (ref 22–31)
CREAT SERPL-MCNC: 0.95 MG/DL — SIGNIFICANT CHANGE UP (ref 0.5–1.3)
GLUCOSE SERPL-MCNC: 94 MG/DL — SIGNIFICANT CHANGE UP (ref 70–99)
HCT VFR BLD CALC: 28.4 % — LOW (ref 39–50)
HGB BLD-MCNC: 9.4 G/DL — LOW (ref 13–17)
MCHC RBC-ENTMCNC: 26.2 PG — LOW (ref 27–34)
MCHC RBC-ENTMCNC: 33.1 G/DL — SIGNIFICANT CHANGE UP (ref 32–36)
MCV RBC AUTO: 79.1 FL — LOW (ref 80–100)
PLATELET # BLD AUTO: 235 K/UL — SIGNIFICANT CHANGE UP (ref 150–400)
POTASSIUM SERPL-MCNC: 4 MMOL/L — SIGNIFICANT CHANGE UP (ref 3.5–5.3)
POTASSIUM SERPL-SCNC: 4 MMOL/L — SIGNIFICANT CHANGE UP (ref 3.5–5.3)
RBC # BLD: 3.59 M/UL — LOW (ref 4.2–5.8)
RBC # FLD: 14.5 % — SIGNIFICANT CHANGE UP (ref 10.3–16.9)
SODIUM SERPL-SCNC: 136 MMOL/L — SIGNIFICANT CHANGE UP (ref 135–145)
WBC # BLD: 7.7 K/UL — SIGNIFICANT CHANGE UP (ref 3.8–10.5)
WBC # FLD AUTO: 7.7 K/UL — SIGNIFICANT CHANGE UP (ref 3.8–10.5)

## 2018-12-22 RX ORDER — CELECOXIB 200 MG/1
1 CAPSULE ORAL
Qty: 60 | Refills: 0 | OUTPATIENT
Start: 2018-12-22

## 2018-12-22 RX ORDER — OXYCODONE HYDROCHLORIDE 5 MG/1
1 TABLET ORAL
Qty: 40 | Refills: 0 | OUTPATIENT
Start: 2018-12-22

## 2018-12-22 RX ORDER — SENNA PLUS 8.6 MG/1
2 TABLET ORAL
Qty: 30 | Refills: 0 | OUTPATIENT
Start: 2018-12-22

## 2018-12-22 RX ORDER — ASPIRIN/CALCIUM CARB/MAGNESIUM 324 MG
1 TABLET ORAL
Qty: 60 | Refills: 0 | OUTPATIENT
Start: 2018-12-22

## 2018-12-22 RX ORDER — OXYCODONE HYDROCHLORIDE 5 MG/1
1 TABLET ORAL
Qty: 60 | Refills: 0 | OUTPATIENT
Start: 2018-12-22

## 2018-12-22 RX ORDER — DOCUSATE SODIUM 100 MG
1 CAPSULE ORAL
Qty: 30 | Refills: 0 | OUTPATIENT
Start: 2018-12-22

## 2018-12-22 RX ORDER — OXYCODONE HYDROCHLORIDE 5 MG/1
1 TABLET ORAL
Qty: 50 | Refills: 0 | OUTPATIENT
Start: 2018-12-22

## 2018-12-22 RX ADMIN — OXYCODONE HYDROCHLORIDE 10 MILLIGRAM(S): 5 TABLET ORAL at 11:11

## 2018-12-22 RX ADMIN — Medication 1 TABLET(S): at 11:12

## 2018-12-22 RX ADMIN — Medication 975 MILLIGRAM(S): at 06:35

## 2018-12-22 RX ADMIN — POLYETHYLENE GLYCOL 3350 17 GRAM(S): 17 POWDER, FOR SOLUTION ORAL at 11:12

## 2018-12-22 RX ADMIN — CELECOXIB 200 MILLIGRAM(S): 200 CAPSULE ORAL at 05:35

## 2018-12-22 RX ADMIN — PANTOPRAZOLE SODIUM 40 MILLIGRAM(S): 20 TABLET, DELAYED RELEASE ORAL at 05:35

## 2018-12-22 RX ADMIN — OXYCODONE HYDROCHLORIDE 10 MILLIGRAM(S): 5 TABLET ORAL at 13:49

## 2018-12-22 RX ADMIN — Medication 975 MILLIGRAM(S): at 05:34

## 2018-12-22 RX ADMIN — Medication 325 MILLIGRAM(S): at 05:35

## 2018-12-22 RX ADMIN — CELECOXIB 200 MILLIGRAM(S): 200 CAPSULE ORAL at 06:35

## 2018-12-22 RX ADMIN — Medication 100 MILLIGRAM(S): at 11:11

## 2018-12-22 RX ADMIN — OXYCODONE HYDROCHLORIDE 10 MILLIGRAM(S): 5 TABLET ORAL at 15:12

## 2018-12-22 RX ADMIN — Medication 100 MILLIGRAM(S): at 05:34

## 2018-12-22 NOTE — PROGRESS NOTE ADULT - REASON FOR ADMISSION
Bilateral knee TKR
Bilateral knee
Bilateral knee TKR
Bilateral knee TKR

## 2018-12-22 NOTE — PROGRESS NOTE ADULT - PROVIDER SPECIALTY LIST ADULT
Internal Medicine
Internal Medicine
Orthopedics
Internal Medicine

## 2018-12-22 NOTE — PROGRESS NOTE ADULT - SUBJECTIVE AND OBJECTIVE BOX
POST OPERATIVE DAY #: 4  STATUS POST: b/l TKA                        SUBJECTIVE: Patient seen and examined. No issues overnight, No CP/SOB. No complaints, working with PT.  Plan for DC to home today      OBJECTIVE:     Vital Signs Last 24 Hrs  T(C): 36 (22 Dec 2018 08:52), Max: 36.6 (21 Dec 2018 20:20)  T(F): 96.8 (22 Dec 2018 08:52), Max: 97.8 (21 Dec 2018 20:20)  HR: 82 (22 Dec 2018 08:52) (64 - 108)  BP: 108/71 (22 Dec 2018 08:52) (103/70 - 138/81)  BP(mean): --  RR: 16 (22 Dec 2018 08:52) (16 - 16)  SpO2: 100% (22 Dec 2018 08:52) (95% - 100%)    Affected extremity:          Dressing:  clean/dry/intact           Sensation:  intact to light touch           Motor exam: 5 / 5 Tibialis Anterior/Gastrocnemius-Soleus          warm well perfused; capillary refill <3 seconds     LABS:                        9.4    7.7   )-----------( 235      ( 22 Dec 2018 07:04 )             28.4     12-22    136  |  95<L>  |  18  ----------------------------<  94  4.0   |  31  |  0.95    Ca    9.1      22 Dec 2018 07:04            MEDICATIONS:acetaminophen   Tablet .. 975 milliGRAM(s) Oral every 8 hours  aluminum hydroxide/magnesium hydroxide/simethicone Suspension 30 milliLiter(s) Oral four times a day PRN  aspirin enteric coated 325 milliGRAM(s) Oral two times a day  bisacodyl Suppository 10 milliGRAM(s) Rectal daily PRN  BUpivacaine liposome 1.3% Injectable (no eMAR) 20 milliLiter(s) Local Injection once  celecoxib 200 milliGRAM(s) Oral two times a day  docusate sodium 100 milliGRAM(s) Oral three times a day  HYDROmorphone  Injectable 0.5 milliGRAM(s) IV Push every 4 hours PRN  magnesium hydroxide Suspension 30 milliLiter(s) Oral daily PRN  multivitamin 1 Tablet(s) Oral daily  ondansetron Injectable 4 milliGRAM(s) IV Push every 6 hours PRN  oxyCODONE    IR 5 milliGRAM(s) Oral every 4 hours PRN  oxyCODONE    IR 10 milliGRAM(s) Oral every 4 hours PRN  oxyCODONE  ER Tablet 10 milliGRAM(s) Oral <User Schedule>  pantoprazole    Tablet 40 milliGRAM(s) Oral before breakfast  polyethylene glycol 3350 17 Gram(s) Oral daily  senna 2 Tablet(s) Oral at bedtime PRN    Anticoagulation:  aspirin enteric coated 325 milliGRAM(s) Oral two times a day      Pain medications:   acetaminophen   Tablet .. 975 milliGRAM(s) Oral every 8 hours  celecoxib 200 milliGRAM(s) Oral two times a day  HYDROmorphone  Injectable 0.5 milliGRAM(s) IV Push every 4 hours PRN  ondansetron Injectable 4 milliGRAM(s) IV Push every 6 hours PRN  oxyCODONE    IR 5 milliGRAM(s) Oral every 4 hours PRN  oxyCODONE    IR 10 milliGRAM(s) Oral every 4 hours PRN  oxyCODONE  ER Tablet 10 milliGRAM(s) Oral <User Schedule>        A/P :   s/p b/l TKA, POD 4  -    Pain control  -    DVT ppx  -    Weight bearing status: WBAT  -    Resume home meds  -    Physical Therapy  -    Dispo: Home with home care

## 2018-12-24 PROCEDURE — C1713: CPT

## 2018-12-24 PROCEDURE — 36415 COLL VENOUS BLD VENIPUNCTURE: CPT

## 2018-12-24 PROCEDURE — 88300 SURGICAL PATH GROSS: CPT

## 2018-12-24 PROCEDURE — 73560 X-RAY EXAM OF KNEE 1 OR 2: CPT

## 2018-12-24 PROCEDURE — 97161 PT EVAL LOW COMPLEX 20 MIN: CPT

## 2018-12-24 PROCEDURE — 85027 COMPLETE CBC AUTOMATED: CPT

## 2018-12-24 PROCEDURE — C1776: CPT

## 2018-12-24 PROCEDURE — 85025 COMPLETE CBC W/AUTO DIFF WBC: CPT

## 2018-12-24 PROCEDURE — 97116 GAIT TRAINING THERAPY: CPT

## 2018-12-24 PROCEDURE — 80048 BASIC METABOLIC PNL TOTAL CA: CPT

## 2018-12-24 PROCEDURE — C1889: CPT

## 2018-12-26 ENCOUNTER — INBOUND DOCUMENT (OUTPATIENT)
Age: 63
End: 2018-12-26

## 2018-12-26 DIAGNOSIS — M17.0 BILATERAL PRIMARY OSTEOARTHRITIS OF KNEE: ICD-10-CM

## 2018-12-26 DIAGNOSIS — K59.00 CONSTIPATION, UNSPECIFIED: ICD-10-CM

## 2018-12-26 DIAGNOSIS — D62 ACUTE POSTHEMORRHAGIC ANEMIA: ICD-10-CM

## 2018-12-26 DIAGNOSIS — G89.29 OTHER CHRONIC PAIN: ICD-10-CM

## 2018-12-26 DIAGNOSIS — J30.9 ALLERGIC RHINITIS, UNSPECIFIED: ICD-10-CM

## 2019-01-08 ENCOUNTER — APPOINTMENT (OUTPATIENT)
Dept: ORTHOPEDIC SURGERY | Facility: CLINIC | Age: 64
End: 2019-01-08
Payer: COMMERCIAL

## 2019-01-08 VITALS — WEIGHT: 212 LBS | BODY MASS INDEX: 29.68 KG/M2 | HEIGHT: 71 IN

## 2019-01-08 PROCEDURE — 99024 POSTOP FOLLOW-UP VISIT: CPT

## 2019-01-08 NOTE — ADDENDUM
[FreeTextEntry1] : Documented by Robert Yan, solely acting as a scribe for Dr. Richi Guaman on 01/08/2019.\par \par All medical record entries made by the Scribe were at my, Dr. Richi Guaman, direction and personally dictated by me on 01/08/2019 . I have reviewed the chart and agree that the record accurately reflects my personal performance of the history, physical exam, assessment and plan. I have also personally directed, reviewed, and agreed with the chart.

## 2019-01-08 NOTE — PROCEDURE
[de-identified] : 63 year old male presents s/p B TKA, DOS: 12/18/18. Patient is doing generally well and denies any current complaints. He is satisfied with his postoperative progress, noting that pain is well controlled. Patient is ambulating with a cane at this time. He has been compliant with PT. Patient's physical exam is unremarkable, and shows no sign of blood clot or infection. I reassured the patient that any remaining residual discomfort will lessen with time. At this point, I recommend that he continue with PT in order to further strengthening and range of motion. Patient will follow-up in 4 weeks.

## 2019-01-08 NOTE — HISTORY OF PRESENT ILLNESS
[___ Weeks Post Op] : [unfilled] weeks post op [Clean/Dry/Intact] : clean, dry and intact [Swelling] : swollen [Neuro Intact] : an unremarkable neurological exam [Vascular Intact] : ~T peripheral vascular exam normal [Doing Well] : is doing well [Excellent Pain Control] : has excellent pain control [No Sign of Infection] : is showing no signs of infection [Chills] : no chills [Constipation] : no constipation [Diarrhea] : no diarrhea [Dysuria] : no dysuria [Fever] : no fever [Nausea] : no nausea [Vomiting] : no vomiting [Healed] : not healed [Erythema] : not erythematous [Discharge] : absent of discharge [Dehiscence] : not dehisced [de-identified] : s/p B TKA, DOS: 12/18/18. [de-identified] : 63 year old male presents s/p B TKA, DOS: 12/18/18 for a post-operative evaluation. Patient is doing generally well and denies any current complaints. He is satisfied with his postoperative progress, noting that pain is well controlled. Patient is ambulating with a cane at this time. He has been compliant with PT. [de-identified] : Right knee: FROM hip, midline incision well healing, dry skin noted, no effusion, 0 - 110 degrees, no crepitus, no medial pain, no lateral pain, no Lachman, no pivot shift, no anterior or posterior drawer, stable, anatomic alignment, no signs of infection, no signs of blood clot. \par \par Left knee: FROM hip, midline incision well healing, dry skin noted, no effusion, 0 - 110 degrees, no crepitus, no medial pain, no lateral pain, no Lachman, no pivot shift, no anterior or posterior drawer, stable, anatomic alignment, no signs of infection, no signs of blood clot.  [de-identified] : No new imaging reviewed today.

## 2019-02-05 ENCOUNTER — APPOINTMENT (OUTPATIENT)
Dept: ORTHOPEDIC SURGERY | Facility: CLINIC | Age: 64
End: 2019-02-05
Payer: COMMERCIAL

## 2019-02-05 VITALS
SYSTOLIC BLOOD PRESSURE: 136 MMHG | DIASTOLIC BLOOD PRESSURE: 84 MMHG | HEART RATE: 86 BPM | HEIGHT: 71 IN | BODY MASS INDEX: 29.68 KG/M2 | WEIGHT: 212 LBS

## 2019-02-05 PROCEDURE — 73562 X-RAY EXAM OF KNEE 3: CPT | Mod: 50

## 2019-02-05 PROCEDURE — 99024 POSTOP FOLLOW-UP VISIT: CPT

## 2019-02-05 NOTE — ADDENDUM
[FreeTextEntry1] : Documented by Robert Yan, solely acting as a scribe for Dr. Richi Guaman on 02/05/2019.\par \par All medical record entries made by the Scribe were at my, Dr. Richi Guaman, direction and personally dictated by me on 02/05/2019 . I have reviewed the chart and agree that the record accurately reflects my personal performance of the history, physical exam, assessment and plan. I have also personally directed, reviewed, and agreed with the chart.

## 2019-02-05 NOTE — HISTORY OF PRESENT ILLNESS
[___ Weeks Post Op] : [unfilled] weeks post op [Clean/Dry/Intact] : clean, dry and intact [Neuro Intact] : an unremarkable neurological exam [Vascular Intact] : ~T peripheral vascular exam normal [Doing Well] : is doing well [Excellent Pain Control] : has excellent pain control [No Sign of Infection] : is showing no signs of infection [Healed] : healed [Chills] : no chills [Constipation] : no constipation [Diarrhea] : no diarrhea [Dysuria] : no dysuria [Fever] : no fever [Nausea] : no nausea [Vomiting] : no vomiting [Erythema] : not erythematous [Discharge] : absent of discharge [Swelling] : not swollen [Dehiscence] : not dehisced [de-identified] : s/p bilateral TKA, DOS: 12/18/18. [de-identified] : 63 year old male presents to the office s/p bilateral TKA, DOS: 12/18/18 for a post-operative evaluation. Patient reports that he has been experiencing some lateral pain and anterior numbness and tingling. He also reports that he has been experiencing sensations of tightness. Patient is ambulating freely at this time. He has been compliant with PT. [de-identified] : Right knee: FROM hip, midline incision well healing, mild effusion, 0 - 115 degrees, no crepitus, no medial pain, no lateral pain, no Lachman, no pivot shift, no anterior or posterior drawer, stable, anatomic alignment, no signs of infection, no signs of blood clot. \par \par Left knee: FROM hip, midline incision well healing, mild effusion, 0 - 115 degrees, no crepitus, no medial pain, no lateral pain, no Lachman, no pivot shift, no anterior or posterior drawer, stable, anatomic alignment, no signs of infection, no signs of blood clot.  [de-identified] : Right Knee x-rays, taken at the office today:\par Standing AP, Lateral, and Merchant films show:\par Total knee replacement hardware in neutral alignment. Without evidence of loosening. well centered patella\par  \par Left Knee x-rays, taken at the office today:\par Standing AP, Lateral, and Merchant films show:\par Total knee replacement hardware in neutral alignment. Without evidence of loosening. well centered patella

## 2019-02-05 NOTE — PROCEDURE
[de-identified] : 63 year old male presents s/p bilateral TKA, DOS: 12/18/18. Patient is doing generally well and denies any current complaints. Patient reports that he has been experiencing some lateral pain and anterior numbness and tingling. He also reports that he has been experiencing some sensations of tightness. Patient is ambulating freely at this time. He has been compliant with PT. Patient's physical exam is unremarkable, and shows no sign of blood clot or infection. I reassured the patient that any remaining residual discomfort will lessen with time, and that he is progressing well. At this point, I recommend that he continue with PT in order to further strengthening and range of motion. Patient will follow-up in 8 weeks.

## 2019-03-07 ENCOUNTER — APPOINTMENT (OUTPATIENT)
Dept: ORTHOPEDIC SURGERY | Facility: CLINIC | Age: 64
End: 2019-03-07
Payer: COMMERCIAL

## 2019-03-07 VITALS — BODY MASS INDEX: 29.68 KG/M2 | WEIGHT: 212 LBS | HEIGHT: 71 IN

## 2019-03-07 DIAGNOSIS — Z96.653 AFTERCARE FOLLOWING JOINT REPLACEMENT SURGERY: ICD-10-CM

## 2019-03-07 DIAGNOSIS — Z47.1 AFTERCARE FOLLOWING JOINT REPLACEMENT SURGERY: ICD-10-CM

## 2019-03-07 PROCEDURE — 99024 POSTOP FOLLOW-UP VISIT: CPT

## 2019-03-07 NOTE — ADDENDUM
[FreeTextEntry1] : Documented by Robert Yan, solely acting as a scribe for Dr. Richi Guaman on 03/07/2019.\par \par All medical record entries made by the Scribe were at my, Dr. Richi Guaman, direction and personally dictated by me on 03/07/2019 . I have reviewed the chart and agree that the record accurately reflects my personal performance of the history, physical exam, assessment and plan. I have also personally directed, reviewed, and agreed with the chart.

## 2019-03-07 NOTE — HISTORY OF PRESENT ILLNESS
[___ Weeks Post Op] : [unfilled] weeks post op [Clean/Dry/Intact] : clean, dry and intact [Healed] : healed [Neuro Intact] : an unremarkable neurological exam [Vascular Intact] : ~T peripheral vascular exam normal [Doing Well] : is doing well [Excellent Pain Control] : has excellent pain control [No Sign of Infection] : is showing no signs of infection [Chills] : no chills [Constipation] : no constipation [Diarrhea] : no diarrhea [Dysuria] : no dysuria [Fever] : no fever [Nausea] : no nausea [Vomiting] : no vomiting [Erythema] : not erythematous [Discharge] : absent of discharge [Swelling] : not swollen [Dehiscence] : not dehisced [de-identified] : s/p bilateral TKA, DOS: 12/18/18. [de-identified] : 64 year old male presents to the office s/p bilateral TKA, DOS: 12/18/18 for a post-operative evaluation. Patient is doing generally well and denies any current complaints. He is satisfied with his postoperative progress, noting that pain is well controlled. Patient is ambulating freely at this time. He has been compliant with PT. [de-identified] : Right knee: FROM hip, midline incision well healed, mild effusion, 0 - 125 degrees, no crepitus, no medial pain, no lateral pain, no Lachman, no pivot shift, no anterior or posterior drawer, stable, anatomic alignment, no signs of infection, no signs of blood clot, patella tracks well.\par \par Left knee: FROM hip, midline incision well healed, no effusion, 0 - 125 degrees, no crepitus, no medial pain, no lateral pain, no Lachman, no pivot shift, no anterior or posterior drawer, stable, anatomic alignment, no signs of infection, no signs of blood clot, patella tracks well. [de-identified] : No new imaging reviewed today.

## 2019-03-07 NOTE — PROCEDURE
[de-identified] : 64 year old male presents s/p bilateral TKA, DOS: 12/18/18. Patient is doing generally well and denies any current complaints. He is satisfied with his postoperative progress, noting that pain is well controlled. Patient is ambulating freely at this time. He has been compliant with PT. Patient's physical exam is unremarkable, and shows no sign of blood clot or infection. I reassured the patient that any remaining residual discomfort will lessen with time. At this point, I recommend that he continue with home exercises for further strengthening and increase in range of motion. Information regarding the new office was provided to the patient, and he was advised to contact the office if any questions remain. Patient will follow up in 3 months at the new office.

## 2019-03-18 ENCOUNTER — FORM ENCOUNTER (OUTPATIENT)
Age: 64
End: 2019-03-18

## 2019-04-04 ENCOUNTER — APPOINTMENT (OUTPATIENT)
Dept: ORTHOPEDIC SURGERY | Facility: CLINIC | Age: 64
End: 2019-04-04

## 2022-04-18 NOTE — PATIENT PROFILE ADULT - NSPROMEDSPATCH_GEN_A_NUR
Patient : Yonatan Arellano Age: 35 year old Sex: male   MRN: [id number] Encounter Date: 4/18/2022      History     Chief Complaint   Patient presents with   • Dizziness   • Headache New Onset on New Symptom     Patient is a 35-year-old [place]-speaking male with past medical history of anxiety.  He comes to the emergency department for evaluation of what he initially describes as dizziness.  He states that he has had increased dizziness for approximately 2 weeks after his girlfriend had a routine physical exam with Pap smear that showed that she had HPV.  Since that time he has been looking up HPV on his been unable to calm his thoughts down.  He describes the dizziness as being constant worry and thoughts going through his head.  He denies any room spinning sensation.  He does occasionally feel a presyncopal sensation when he feels that his thoughts are on overdrive.  He also describes an occasional left-sided head discomfort.  He states his 'dizziness' tends to be worse in the mornings and afternoon.  Feels that he is tired frequently and unsure if he is sleeping well or sleeping too much.  Denies any nausea/vomiting/diarrhea or abdominal pain.  Does endorse that he suffers from constipation.  States that he will have some discomfort with itching and did note a lump around the anus recently.    He denies any fevers, chills, runny nose, sinus pain or congestion, sore throat, chest pain or pressure, palpitations, cough, shortness of breath, generalized fatigue or body aches.    Patient states that he has been taking escitalopram for anxiety.  Stopped 3 days ago because as he was thinking heavily about things he thought it may be making things worse, but did not speak to his doctor before stopping.  States that he was placed on the medication for anxiety and it has helped to reduce the feelings of palpitations and anxiety attacks that he used to suffer from.    He denies any other known stressors aside from the recent  information from his girlfriend.  He denies any recent head injuries.     Social history: Denies any current or previous tobacco use.  Denies alcohol use.  Denies drug use.    Patient had COVID around the onset of the pandemic.  He has not been vaccinated.    Currently resting on emergency department cart in no acute distress.      Video  utilized for HPI and physical exam.           No Known Allergies    There are no discharge medications for this patient.      Past Medical History:   Diagnosis Date   • Anxiety        No past surgical history on file.    No family history on file.    Social History     Tobacco Use   • Smoking status: Current Every Day Smoker   • Smokeless tobacco: Not on file   Substance Use Topics   • Alcohol use: Not Currently   • Drug use: Not Currently       Review of Systems   Constitutional: Negative for activity change, chills, fatigue and fever.   HENT: Negative for congestion, ear pain, rhinorrhea, sinus pressure and sore throat.    Eyes: Negative for pain, redness and visual disturbance.   Respiratory: Negative for cough, chest tightness and shortness of breath.    Cardiovascular: Negative for chest pain, palpitations and leg swelling.   Gastrointestinal: Positive for constipation. Negative for abdominal distention, abdominal pain, diarrhea, nausea and vomiting.   Genitourinary: Negative for difficulty urinating, dysuria, frequency and hematuria.   Musculoskeletal: Negative for arthralgias and myalgias.   Skin: Negative for color change and rash.   Neurological: Positive for dizziness. Negative for weakness, light-headedness and headaches.   Psychiatric/Behavioral: Negative for agitation, hallucinations, self-injury and suicidal ideas. The patient is nervous/anxious.        Physical Exam     ED Triage Vitals [04/18/22 0824]   ED Triage Vitals Group      Temp 98.1 °F (36.7 °C)      Heart Rate 63      Resp 16      /65      SpO2 98 %      EtCO2 mmHg       Height 5' 1\" (1.549  m)      Weight 191 lb 12.8 oz (87 kg)      Weight Scale Used Standing scale      BMI (Calculated) 36.24      IBW/kg (Calculated) 52.3       Physical Exam  Vitals and nursing note reviewed.   Constitutional:       General: He is not in acute distress.     Appearance: He is not toxic-appearing.   HENT:      Head: Normocephalic.      Mouth/Throat:      Mouth: Mucous membranes are moist.      Neck: Neck supple.   Eyes:      Conjunctiva/sclera: Conjunctivae normal.      Pupils: Pupils are equal, round, and reactive to light.      Comments: Negative nystagmus   Cardiovascular:      Rate and Rhythm: Normal rate and regular rhythm.      Pulses: Normal pulses.   Pulmonary:      Effort: Pulmonary effort is normal.      Breath sounds: Normal breath sounds.   Abdominal:      General: Bowel sounds are normal.      Palpations: Abdomen is soft. There is no mass.      Tenderness: There is no abdominal tenderness. There is no right CVA tenderness or left CVA tenderness.      Hernia: No hernia is present.   Musculoskeletal:         General: Normal range of motion.      Right lower leg: No edema.      Left lower leg: No edema.   Skin:     General: Skin is warm and dry.      Capillary Refill: Capillary refill takes less than 2 seconds.   Neurological:      Mental Status: He is alert and oriented to person, place, and time.      Cranial Nerves: No dysarthria or facial asymmetry.      Motor: No weakness, tremor, abnormal muscle tone or pronator drift.      Gait: Gait is intact.   Psychiatric:         Mood and Affect: Mood normal.         Speech: Speech normal.         Thought Content: Thought content normal. Thought content does not include homicidal or suicidal ideation.         Judgment: Judgment normal.      Comments: Describes feelings of racing thoughts and constant worry.          ED Course     Procedures    Lab Results     No results found for this visit on 04/18/22.    EKG Results     EKG tracing interpreted by ED  physician    Radiology Results     Imaging Results    None         ED Medication Orders (From admission, onward)    None               MDM  Number of Diagnoses or Management Options  Anxiety  Constipation, unspecified constipation type  Diagnosis management comments: Patient is a 35-year-old male with above-stated HPI.  On physical exam he is awake alert [place] x4 no acute distress.  Currently afebrile and hemodynamically stable.  Physical exam is overall unremarkable.  He is neurologically intact.  He does not have any visible hemorrhoids and does have an area in the perineal area where it appears he is healing abrasion..     Plan of care: Discharge home to follow-up with primary care provider.  Patient encouraged to continue his anxiety medication and to discuss with his doctor anytime he feels he wants to stop taking it.  Also discussed that he may need an increase in dosage given the increased stress he is recently under.  Also discussed assistance with current constipation.  Encouraged to drink more fluids and increase fiber in his diet.  If he cannot do so with fruits and vegetables to use a daily fiber supplement.  Also given 1% cortisone cream for current irritation to the rectal and perineal area related to constipation.  Strict instructions to return to the emergency department with any worsening symptoms or concerns.  Patient verbalized understanding and feels comfortable with current plan.      Clinical Impression     ED Diagnosis   1. Anxiety     2. Constipation, unspecified constipation type         Disposition        Discharge 4/18/2022 11:07 GUILLERMO Arellano discharge to home/self care.                         Estela Espinoza, JOSE ROBERTO  04/18/22 1216     none

## 2024-04-11 NOTE — PHYSICAL THERAPY INITIAL EVALUATION ADULT - RANGE OF MOTION EXAMINATION, REHAB EVAL
good, to achieve stated therapy goals except for b/l knee 5-55 degrees/no ROM deficits were identified

## 2024-09-11 NOTE — ED ADULT NURSE NOTE - NS ED NURSE LEVEL OF CONSCIOUSNESS ORIENTATION
Called and left message to call back. PATRICIA Juan     Oriented - self; Oriented - place; Oriented - time

## 2025-03-20 NOTE — ED PROVIDER NOTE - CONDUCTED A DETAILED DISCUSSION WITH PATIENT AND/OR GUARDIAN REGARDING, MDM
11-Mar-2025 00:00 11-Mar-2025 16:50 13-Mar-2025 09:23 20-Mar-2025 17:27 12-Mar-2025 12:32 return to ED if symptoms worsen, persist or questions arise/need for outpatient follow-up/radiology results

## 2025-03-25 NOTE — BRIEF OPERATIVE NOTE - OPERATION/FINDINGS
Medication:    Focalin XR 40 MG CAPSULE SR 24 HR     Last refill date: 02/12/2025  Last med check: 12/11/2024  Med check scheduled: appt on 04/09/2025  Last Well child Check: 02/21/2024    PDMP reviewed.  Prescribed: 2/12/2025  Dispensed: 2/12/2025  Sold: 2/13/2025      Tricompartmental osteoarthritis